# Patient Record
Sex: FEMALE | Race: ASIAN | NOT HISPANIC OR LATINO
[De-identification: names, ages, dates, MRNs, and addresses within clinical notes are randomized per-mention and may not be internally consistent; named-entity substitution may affect disease eponyms.]

---

## 2022-06-01 ENCOUNTER — NON-APPOINTMENT (OUTPATIENT)
Age: 42
End: 2022-06-01

## 2022-06-01 PROBLEM — Z00.00 ENCOUNTER FOR PREVENTIVE HEALTH EXAMINATION: Status: ACTIVE | Noted: 2022-06-01

## 2022-06-09 ENCOUNTER — APPOINTMENT (OUTPATIENT)
Dept: ORTHOPEDIC SURGERY | Facility: CLINIC | Age: 42
End: 2022-06-09
Payer: COMMERCIAL

## 2022-06-09 ENCOUNTER — NON-APPOINTMENT (OUTPATIENT)
Age: 42
End: 2022-06-09

## 2022-06-09 VITALS — HEIGHT: 69 IN | BODY MASS INDEX: 20.24 KG/M2 | WEIGHT: 136.69 LBS

## 2022-06-09 PROCEDURE — 99204 OFFICE O/P NEW MOD 45 MIN: CPT

## 2022-06-09 PROCEDURE — 73521 X-RAY EXAM HIPS BI 2 VIEWS: CPT

## 2022-06-09 RX ORDER — RISEDRONATE SODIUM 35 MG/1
35 TABLET, FILM COATED ORAL
Qty: 5 | Refills: 5 | Status: ACTIVE | COMMUNITY
Start: 2022-06-09 | End: 1900-01-01

## 2022-06-09 RX ORDER — DICLOFENAC SODIUM 50 MG/1
50 TABLET, DELAYED RELEASE ORAL
Qty: 20 | Refills: 0 | Status: ACTIVE | COMMUNITY
Start: 2022-06-09 | End: 1900-01-01

## 2022-06-09 NOTE — HISTORY OF PRESENT ILLNESS
[de-identified] : ALANA GA is a 41 year old female who presents for initial evaluation of bilateral hip pain R>L. Denies injury. Patient presents in a cane. She got COVID last Feburary and was placed on cortisone. 2 weeks and stopped it. She had sever leg pain. She had an MRI done 2 weeks ago. She states the pain has worsened over the past few weeks. She has difficulty with adls, getting up from seated position, getting out of a car, walking, prolonged standing.

## 2022-06-09 NOTE — DISCUSSION/SUMMARY
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 Discussed with patient non-operative and operative treatment. We had a lengthy discussion regarding core decompression versus total hip replacement, risk and benefits discussed. I discussed how core decompression with this level of AVN may fail which would warrant a total hip replacement in the future regardless.\par \par We discussed obtaining an MRI in 2 weeks. An MRI of the pelvis was ordered to evaluate for AVN. Follow up when results are available and discuss further surgical treatment plan. \par Rx Actonel and Meloxicam provided, r/b/a discussed.

## 2022-06-09 NOTE — PHYSICAL EXAM
[Normal RLE] : Right Lower Extremity: No scars, rashes, lesions, ulcers, skin intact [Normal LLE] : Left Lower Extremity: No scars, rashes, lesions, ulcers, skin intact [Normal Touch] : sensation intact for touch [Normal] : no peripheral adenopathy appreciated [de-identified] : Patient appears stated age in no acute respiratory distress. Patient is alert oriented x3. Patient has normal mood and affect. \par \par Bilateral knee exam\par Range of motion of the knee is 0-120°. \par Skin is normal. No rash.\par There is no effusion. No medial or lateral joint line tenderness. No swelling, no pitting edema.\par Overall alignment of the knee is then slight varus. Good anterior posterior stability. Firm endpoints on anterior and posterior drawer. \par Medial lateral stability is intact. Firm endpoint on medial and lateral stress testing .\par Kuldeep test is negative.\par Quadriceps strength 5/ 5. There is no loss of muscle volume in the thigh. \par Good anterior posterior and mediolateral stability.\par Sensation in the extremities intact. \par Discrimination is intact. Good DP and PT pulses.\par 		\par Bilateral hip exam\par The hip is mildly painful at the groin on log roll. \par At 70° flexion patient has minimal discomfort in the groin. At 90° flexion internal rotation is limited to less than 10°. External rotation is 25 no pain.\par Skin is normal. \par There is no loss of muscle wall remained the extremity. On lateral decubitus examination there is no tenderness in the greater trochanter. \par Resisted abduction is 4-5. There is no pain on abduction.\par Straight leg raise up to his 80° pain-free. No pain in the lower back.\par There is no adduction contracture. \par \par Lower Extremity Examination \par Bilateral lower extremity skin is normal. There is no rash. There is no edema and lymphadenopathy. DP and PT pulses intact. Sensation is intact.  [de-identified] : X-rays of the right/left hip 2 views obtained today 06/09/2022 shows evidence of AVN, femoral head collapse, R>L\par __________________________________________________________________\par 	\par EXAM:  MRI PELVIS WITHOUT CONTRAST\par HISTORY:  Right buttock pain.\par \par TECHNIQUE:  Multiplanar, multi-sequence MRI of the osseous pelvis was obtained on a 3T scanner according to standard protocol without intravenous or intra-articular contrast. \par \par COMPARISON:  None available.\par \par FINDINGS:\par \par Osseous structures: \par No fracture or\par \par Hip joint: \par Acute avascular necrosis of the right femoral head involving approximately 75% of the articular surface. There is no subchondral collapse. There is mild cartilage thinning at the hip joint. There is extensive bone marrow edema extending to the intercondylar region.\par Acute avascular necrosis of the left femoral head involving approximately 70% of the articular surface. There is extensive bone marrow edema extending to the intercondylar region. High-grade cartilage loss posteriorly with bone marrow edema in the posterior acetabulum. There is no articular surface collapse.\par Large bilateral hip joint effusions.\par \par Labrum: \par No visualized labral tear.\par \par Sacroiliac joints:\par Degenerative changes of the inferior sacroiliac joints.\par \par Tendons: \par Mild bilateral insertional gluteus minimus medius and minimus tendinosis with peritendinous edema.\par No trochanteric bursitis.\par Mild left insertional iliopsoas tendinosis with peritendinitis.\par No iliopsoas bursitis.\par No hamstring tendinosis, peritendinitis, or tear.\par Adductor origins and rectus abdominis insertions on pubic symphysis are grossly intact.\par \par Ischiofemoral fossae: \par No narrowing of the ischiofemoral spaces. No edema in the quadratus femoris muscles.\par \par Inguinal canals: \par No inguinal hernias.\par \par Included lumbar spine:\par Normal visualized lower lumbar spine.\par \par Muscles and nerves:\par No intramuscular edema or atrophy.\par Visualized sciatic and femoral nerves are unremarkable.\par \par Soft tissues:\par Mild amount of free fluid in the pelvis, likely physiologic. \par Physiologic fluid in the endometrial canal.\par \par IMPRESSION:  MRI of the pelvis demonstrates:\par \par Stage II avascular necrosis of both femoral heads with left hip cartilage loss posteriorly and subchondral stress response in the posterior acetabulum.\par \par Large bilateral hip joint effusions.\par \par Thank you for the opportunity to participate in the care of this patient.  \par  \par ELIZABETH HELTON MD  - Electronically Signed: 05- 9:24 AM \par Physician to Physician Direct Line is: [phone]

## 2022-06-09 NOTE — ADDENDUM
[FreeTextEntry1] : I, Edmundo Carmona, acted solely as a scribe for Dr. Hamida Quinones MD on this date 06/09/2022  .\par  \par All medical record entries made by the Scribe were at my, Dr. Hamida Quinones MD , direction and personally dictated by me on 06/09/2022 . I have reviewed the chart and agree that the record accurately reflects my personal performance of the history, physical exam, assessment and plan. I have also personally directed, reviewed, and agreed with the chart.

## 2022-06-22 ENCOUNTER — RESULT REVIEW (OUTPATIENT)
Age: 42
End: 2022-06-22

## 2022-06-22 RX ORDER — DICLOFENAC SODIUM 50 MG/1
50 TABLET, DELAYED RELEASE ORAL
Qty: 28 | Refills: 0 | Status: ACTIVE | COMMUNITY
Start: 2022-06-22 | End: 1900-01-01

## 2022-06-23 ENCOUNTER — APPOINTMENT (OUTPATIENT)
Dept: MRI IMAGING | Facility: CLINIC | Age: 42
End: 2022-06-23
Payer: COMMERCIAL

## 2022-06-23 ENCOUNTER — OUTPATIENT (OUTPATIENT)
Dept: OUTPATIENT SERVICES | Facility: HOSPITAL | Age: 42
LOS: 1 days | End: 2022-06-23
Payer: COMMERCIAL

## 2022-06-23 ENCOUNTER — RESULT REVIEW (OUTPATIENT)
Age: 42
End: 2022-06-23

## 2022-06-23 ENCOUNTER — TRANSCRIPTION ENCOUNTER (OUTPATIENT)
Age: 42
End: 2022-06-23

## 2022-06-23 DIAGNOSIS — M87.051 IDIOPATHIC ASEPTIC NECROSIS OF RIGHT FEMUR: ICD-10-CM

## 2022-06-23 DIAGNOSIS — M87.052 IDIOPATHIC ASEPTIC NECROSIS OF LEFT FEMUR: ICD-10-CM

## 2022-06-23 PROCEDURE — 73721 MRI JNT OF LWR EXTRE W/O DYE: CPT

## 2022-06-23 PROCEDURE — 73721 MRI JNT OF LWR EXTRE W/O DYE: CPT | Mod: 26,RT

## 2022-06-30 ENCOUNTER — APPOINTMENT (OUTPATIENT)
Dept: ORTHOPEDIC SURGERY | Facility: CLINIC | Age: 42
End: 2022-06-30

## 2022-06-30 DIAGNOSIS — M87.051 IDIOPATHIC ASEPTIC NECROSIS OF RIGHT FEMUR: ICD-10-CM

## 2022-06-30 PROCEDURE — 99215 OFFICE O/P EST HI 40 MIN: CPT

## 2022-07-01 PROBLEM — M87.051 AVASCULAR NECROSIS OF BONE OF RIGHT HIP: Status: ACTIVE | Noted: 2022-06-09

## 2022-07-06 ENCOUNTER — TRANSCRIPTION ENCOUNTER (OUTPATIENT)
Age: 42
End: 2022-07-06

## 2022-07-06 ENCOUNTER — OUTPATIENT (OUTPATIENT)
Dept: OUTPATIENT SERVICES | Facility: HOSPITAL | Age: 42
LOS: 1 days | End: 2022-07-06

## 2022-07-06 VITALS
HEIGHT: 69 IN | DIASTOLIC BLOOD PRESSURE: 73 MMHG | WEIGHT: 141.1 LBS | TEMPERATURE: 98 F | OXYGEN SATURATION: 100 % | SYSTOLIC BLOOD PRESSURE: 113 MMHG | HEART RATE: 75 BPM | RESPIRATION RATE: 17 BRPM

## 2022-07-06 DIAGNOSIS — M16.11 UNILATERAL PRIMARY OSTEOARTHRITIS, RIGHT HIP: ICD-10-CM

## 2022-07-06 LAB
A1C WITH ESTIMATED AVERAGE GLUCOSE RESULT: 5 % — SIGNIFICANT CHANGE UP (ref 4–5.6)
ANION GAP SERPL CALC-SCNC: 12 MMOL/L — SIGNIFICANT CHANGE UP (ref 7–14)
APPEARANCE UR: CLEAR — SIGNIFICANT CHANGE UP
BILIRUB UR-MCNC: NEGATIVE — SIGNIFICANT CHANGE UP
BLD GP AB SCN SERPL QL: NEGATIVE — SIGNIFICANT CHANGE UP
BUN SERPL-MCNC: 9 MG/DL — SIGNIFICANT CHANGE UP (ref 7–23)
CALCIUM SERPL-MCNC: 9.3 MG/DL — SIGNIFICANT CHANGE UP (ref 8.4–10.5)
CHLORIDE SERPL-SCNC: 103 MMOL/L — SIGNIFICANT CHANGE UP (ref 98–107)
CO2 SERPL-SCNC: 23 MMOL/L — SIGNIFICANT CHANGE UP (ref 22–31)
COLOR SPEC: YELLOW — SIGNIFICANT CHANGE UP
CREAT SERPL-MCNC: 0.62 MG/DL — SIGNIFICANT CHANGE UP (ref 0.5–1.3)
DIFF PNL FLD: NEGATIVE — SIGNIFICANT CHANGE UP
EGFR: 113 ML/MIN/1.73M2 — SIGNIFICANT CHANGE UP
ESTIMATED AVERAGE GLUCOSE: 97 — SIGNIFICANT CHANGE UP
GLUCOSE SERPL-MCNC: 86 MG/DL — SIGNIFICANT CHANGE UP (ref 70–99)
GLUCOSE UR QL: NEGATIVE — SIGNIFICANT CHANGE UP
HCG UR QL: NEGATIVE — SIGNIFICANT CHANGE UP
HCT VFR BLD CALC: 34.4 % — LOW (ref 34.5–45)
HGB BLD-MCNC: 11 G/DL — LOW (ref 11.5–15.5)
KETONES UR-MCNC: NEGATIVE — SIGNIFICANT CHANGE UP
LEUKOCYTE ESTERASE UR-ACNC: NEGATIVE — SIGNIFICANT CHANGE UP
MCHC RBC-ENTMCNC: 26.1 PG — LOW (ref 27–34)
MCHC RBC-ENTMCNC: 32 GM/DL — SIGNIFICANT CHANGE UP (ref 32–36)
MCV RBC AUTO: 81.7 FL — SIGNIFICANT CHANGE UP (ref 80–100)
NITRITE UR-MCNC: NEGATIVE — SIGNIFICANT CHANGE UP
NRBC # BLD: 0 /100 WBCS — SIGNIFICANT CHANGE UP
NRBC # FLD: 0 K/UL — SIGNIFICANT CHANGE UP
PH UR: 6 — SIGNIFICANT CHANGE UP (ref 5–8)
PLATELET # BLD AUTO: 225 K/UL — SIGNIFICANT CHANGE UP (ref 150–400)
POTASSIUM SERPL-MCNC: 4 MMOL/L — SIGNIFICANT CHANGE UP (ref 3.5–5.3)
POTASSIUM SERPL-SCNC: 4 MMOL/L — SIGNIFICANT CHANGE UP (ref 3.5–5.3)
PROT UR-MCNC: NEGATIVE — SIGNIFICANT CHANGE UP
RBC # BLD: 4.21 M/UL — SIGNIFICANT CHANGE UP (ref 3.8–5.2)
RBC # FLD: 14.2 % — SIGNIFICANT CHANGE UP (ref 10.3–14.5)
RH IG SCN BLD-IMP: NEGATIVE — SIGNIFICANT CHANGE UP
SODIUM SERPL-SCNC: 138 MMOL/L — SIGNIFICANT CHANGE UP (ref 135–145)
SP GR SPEC: 1.02 — SIGNIFICANT CHANGE UP (ref 1–1.05)
UROBILINOGEN FLD QL: SIGNIFICANT CHANGE UP
WBC # BLD: 6.31 K/UL — SIGNIFICANT CHANGE UP (ref 3.8–10.5)
WBC # FLD AUTO: 6.31 K/UL — SIGNIFICANT CHANGE UP (ref 3.8–10.5)

## 2022-07-06 RX ORDER — SODIUM CHLORIDE 9 MG/ML
1000 INJECTION, SOLUTION INTRAVENOUS
Refills: 0 | Status: DISCONTINUED | OUTPATIENT
Start: 2022-07-18 | End: 2022-07-19

## 2022-07-06 RX ORDER — SODIUM CHLORIDE 9 MG/ML
3 INJECTION INTRAMUSCULAR; INTRAVENOUS; SUBCUTANEOUS EVERY 8 HOURS
Refills: 0 | Status: DISCONTINUED | OUTPATIENT
Start: 2022-07-18 | End: 2022-07-19

## 2022-07-06 NOTE — H&P PST ADULT - MUSCULOSKELETAL
details… no joint swelling/no joint erythema/no calf tenderness/no chest wall tenderness/abnormal gait

## 2022-07-06 NOTE — H&P PST ADULT - PROBLEM SELECTOR PLAN 1
Schedule for right total hip replacement direct anterior approach tentatively on 07/18/2022. Pre op instructions, famotidine, chlorhexidine gluconate soap given and explained. Pt verbalized understanding.  Pt instructed to bring urine specimen on day of surgery urine cup given to pt who verbalized understanding.    Covid test ordered, list of [facility] Covid testing sites given to pt who verbalized understanding.

## 2022-07-06 NOTE — H&P PST ADULT - HISTORY OF PRESENT ILLNESS
43 y/o female h/o: bilateral hip pain mostly at night   Avascular necrosis on both hips, last MRI showed 75% damage and 70% on the left hip .  43 y/o female presents for pre op evaluation with C/O bilateral hip pain mostly at night x6 months. Pt stated that "last MRI showed 75% damage in the right hip and 70% in the left hip". Pt also stated that she has "avascular necrosis on both hips". Now schedule for right total hip replacement direct anterior approach

## 2022-07-06 NOTE — H&P PST ADULT - NSANTHOSAYNRD_GEN_A_CORE
No. [place] screening performed.  STOP BANG Legend: 0-2 = LOW Risk; 3-4 = INTERMEDIATE Risk; 5-8 = HIGH Risk

## 2022-07-07 LAB
CULTURE RESULTS: SIGNIFICANT CHANGE UP
MRSA PCR RESULT.: SIGNIFICANT CHANGE UP
S AUREUS DNA NOSE QL NAA+PROBE: SIGNIFICANT CHANGE UP
SPECIMEN SOURCE: SIGNIFICANT CHANGE UP

## 2022-07-07 RX ORDER — RISEDRONATE SODIUM 35 MG/1
35 TABLET, FILM COATED ORAL
Qty: 1 | Refills: 3 | Status: ACTIVE | COMMUNITY
Start: 2022-07-07 | End: 1900-01-01

## 2022-07-07 RX ORDER — DICLOFENAC SODIUM 50 MG/1
50 TABLET, DELAYED RELEASE ORAL
Qty: 20 | Refills: 0 | Status: ACTIVE | COMMUNITY
Start: 2022-07-07 | End: 1900-01-01

## 2022-07-13 PROBLEM — M87.059 IDIOPATHIC ASEPTIC NECROSIS OF UNSPECIFIED FEMUR: Chronic | Status: ACTIVE | Noted: 2022-07-06

## 2022-07-15 ENCOUNTER — NON-APPOINTMENT (OUTPATIENT)
Age: 42
End: 2022-07-15

## 2022-07-15 NOTE — ASU PATIENT PROFILE, ADULT - FALL HARM RISK - HARM RISK INTERVENTIONS

## 2022-07-16 LAB — SARS-COV-2 RNA SPEC QL NAA+PROBE: SIGNIFICANT CHANGE UP

## 2022-07-17 ENCOUNTER — TRANSCRIPTION ENCOUNTER (OUTPATIENT)
Age: 42
End: 2022-07-17

## 2022-07-18 ENCOUNTER — APPOINTMENT (OUTPATIENT)
Dept: ORTHOPEDIC SURGERY | Facility: HOSPITAL | Age: 42
End: 2022-07-18

## 2022-07-18 ENCOUNTER — TRANSCRIPTION ENCOUNTER (OUTPATIENT)
Age: 42
End: 2022-07-18

## 2022-07-18 ENCOUNTER — INPATIENT (INPATIENT)
Facility: HOSPITAL | Age: 42
LOS: 0 days | Discharge: HOME HEALTH | End: 2022-07-19
Attending: ORTHOPAEDIC SURGERY | Admitting: ORTHOPAEDIC SURGERY

## 2022-07-18 VITALS
TEMPERATURE: 98 F | HEIGHT: 69 IN | WEIGHT: 136.69 LBS | DIASTOLIC BLOOD PRESSURE: 75 MMHG | HEART RATE: 75 BPM | RESPIRATION RATE: 16 BRPM | SYSTOLIC BLOOD PRESSURE: 104 MMHG | OXYGEN SATURATION: 100 %

## 2022-07-18 DIAGNOSIS — M16.11 UNILATERAL PRIMARY OSTEOARTHRITIS, RIGHT HIP: ICD-10-CM

## 2022-07-18 LAB
GLUCOSE BLDC GLUCOMTR-MCNC: 72 MG/DL — SIGNIFICANT CHANGE UP (ref 70–99)
HCG UR QL: NEGATIVE — SIGNIFICANT CHANGE UP

## 2022-07-18 PROCEDURE — 72170 X-RAY EXAM OF PELVIS: CPT | Mod: 26

## 2022-07-18 PROCEDURE — 27130 TOTAL HIP ARTHROPLASTY: CPT | Mod: RT

## 2022-07-18 DEVICE — STEM NECK ANG HIP V40 127D SZ 6 35X111MM: Type: IMPLANTABLE DEVICE | Status: FUNCTIONAL

## 2022-07-18 DEVICE — HEAD BIOLOX DELTA CERAMIC V40 32MM PLUS0: Type: IMPLANTABLE DEVICE | Status: FUNCTIONAL

## 2022-07-18 DEVICE — IMPLANTABLE DEVICE: Type: IMPLANTABLE DEVICE | Status: FUNCTIONAL

## 2022-07-18 DEVICE — SHELL ACET TRIDENT II D 48MM: Type: IMPLANTABLE DEVICE | Status: FUNCTIONAL

## 2022-07-18 RX ORDER — PANTOPRAZOLE SODIUM 20 MG/1
40 TABLET, DELAYED RELEASE ORAL ONCE
Refills: 0 | Status: COMPLETED | OUTPATIENT
Start: 2022-07-18 | End: 2022-07-18

## 2022-07-18 RX ORDER — PANTOPRAZOLE SODIUM 20 MG/1
40 TABLET, DELAYED RELEASE ORAL
Refills: 0 | Status: DISCONTINUED | OUTPATIENT
Start: 2022-07-18 | End: 2022-07-19

## 2022-07-18 RX ORDER — CEFAZOLIN SODIUM 1 G
2000 VIAL (EA) INJECTION EVERY 8 HOURS
Refills: 0 | Status: COMPLETED | OUTPATIENT
Start: 2022-07-18 | End: 2022-07-19

## 2022-07-18 RX ORDER — SODIUM CHLORIDE 9 MG/ML
500 INJECTION, SOLUTION INTRAVENOUS ONCE
Refills: 0 | Status: COMPLETED | OUTPATIENT
Start: 2022-07-18 | End: 2022-07-18

## 2022-07-18 RX ORDER — ONDANSETRON 8 MG/1
4 TABLET, FILM COATED ORAL EVERY 6 HOURS
Refills: 0 | Status: DISCONTINUED | OUTPATIENT
Start: 2022-07-18 | End: 2022-07-19

## 2022-07-18 RX ORDER — KETOROLAC TROMETHAMINE 30 MG/ML
15 SYRINGE (ML) INJECTION EVERY 6 HOURS
Refills: 0 | Status: DISCONTINUED | OUTPATIENT
Start: 2022-07-18 | End: 2022-07-19

## 2022-07-18 RX ORDER — ACETAMINOPHEN 500 MG
1000 TABLET ORAL ONCE
Refills: 0 | Status: DISCONTINUED | OUTPATIENT
Start: 2022-07-18 | End: 2022-07-19

## 2022-07-18 RX ORDER — OXYCODONE HYDROCHLORIDE 5 MG/1
10 TABLET ORAL
Refills: 0 | Status: DISCONTINUED | OUTPATIENT
Start: 2022-07-18 | End: 2022-07-19

## 2022-07-18 RX ORDER — ACETAMINOPHEN 500 MG
975 TABLET ORAL ONCE
Refills: 0 | Status: COMPLETED | OUTPATIENT
Start: 2022-07-18 | End: 2022-07-18

## 2022-07-18 RX ORDER — SODIUM CHLORIDE 9 MG/ML
1000 INJECTION, SOLUTION INTRAVENOUS
Refills: 0 | Status: DISCONTINUED | OUTPATIENT
Start: 2022-07-18 | End: 2022-07-19

## 2022-07-18 RX ORDER — OXYCODONE HYDROCHLORIDE 5 MG/1
5 TABLET ORAL
Refills: 0 | Status: DISCONTINUED | OUTPATIENT
Start: 2022-07-18 | End: 2022-07-19

## 2022-07-18 RX ORDER — SENNA PLUS 8.6 MG/1
2 TABLET ORAL AT BEDTIME
Refills: 0 | Status: DISCONTINUED | OUTPATIENT
Start: 2022-07-18 | End: 2022-07-19

## 2022-07-18 RX ORDER — SODIUM CHLORIDE 9 MG/ML
500 INJECTION, SOLUTION INTRAVENOUS ONCE
Refills: 0 | Status: COMPLETED | OUTPATIENT
Start: 2022-07-18 | End: 2022-07-19

## 2022-07-18 RX ORDER — POLYETHYLENE GLYCOL 3350 17 G/17G
17 POWDER, FOR SOLUTION ORAL AT BEDTIME
Refills: 0 | Status: DISCONTINUED | OUTPATIENT
Start: 2022-07-18 | End: 2022-07-19

## 2022-07-18 RX ORDER — ACETAMINOPHEN 500 MG
975 TABLET ORAL EVERY 8 HOURS
Refills: 0 | Status: DISCONTINUED | OUTPATIENT
Start: 2022-07-18 | End: 2022-07-19

## 2022-07-18 RX ORDER — MAGNESIUM HYDROXIDE 400 MG/1
30 TABLET, CHEWABLE ORAL DAILY
Refills: 0 | Status: DISCONTINUED | OUTPATIENT
Start: 2022-07-18 | End: 2022-07-19

## 2022-07-18 RX ORDER — CHOLECALCIFEROL (VITAMIN D3) 125 MCG
1000 CAPSULE ORAL DAILY
Refills: 0 | Status: DISCONTINUED | OUTPATIENT
Start: 2022-07-18 | End: 2022-07-19

## 2022-07-18 RX ORDER — TRAMADOL HYDROCHLORIDE 50 MG/1
50 TABLET ORAL ONCE
Refills: 0 | Status: DISCONTINUED | OUTPATIENT
Start: 2022-07-18 | End: 2022-07-18

## 2022-07-18 RX ORDER — TRAMADOL HYDROCHLORIDE 50 MG/1
50 TABLET ORAL EVERY 6 HOURS
Refills: 0 | Status: DISCONTINUED | OUTPATIENT
Start: 2022-07-18 | End: 2022-07-19

## 2022-07-18 RX ORDER — ASPIRIN/CALCIUM CARB/MAGNESIUM 324 MG
325 TABLET ORAL
Refills: 0 | Status: DISCONTINUED | OUTPATIENT
Start: 2022-07-18 | End: 2022-07-19

## 2022-07-18 RX ORDER — CELECOXIB 200 MG/1
200 CAPSULE ORAL EVERY 12 HOURS
Refills: 0 | Status: DISCONTINUED | OUTPATIENT
Start: 2022-07-19 | End: 2022-07-19

## 2022-07-18 RX ADMIN — SODIUM CHLORIDE 3 MILLILITER(S): 9 INJECTION INTRAMUSCULAR; INTRAVENOUS; SUBCUTANEOUS at 13:44

## 2022-07-18 RX ADMIN — Medication 15 MILLIGRAM(S): at 23:14

## 2022-07-18 RX ADMIN — Medication 975 MILLIGRAM(S): at 10:50

## 2022-07-18 RX ADMIN — Medication 15 MILLIGRAM(S): at 18:10

## 2022-07-18 RX ADMIN — SODIUM CHLORIDE 500 MILLILITER(S): 9 INJECTION, SOLUTION INTRAVENOUS at 13:43

## 2022-07-18 RX ADMIN — OXYCODONE HYDROCHLORIDE 5 MILLIGRAM(S): 5 TABLET ORAL at 17:12

## 2022-07-18 RX ADMIN — Medication 15 MILLIGRAM(S): at 18:53

## 2022-07-18 RX ADMIN — OXYCODONE HYDROCHLORIDE 10 MILLIGRAM(S): 5 TABLET ORAL at 22:23

## 2022-07-18 RX ADMIN — SODIUM CHLORIDE 500 MILLILITER(S): 9 INJECTION, SOLUTION INTRAVENOUS at 19:56

## 2022-07-18 RX ADMIN — PANTOPRAZOLE SODIUM 40 MILLIGRAM(S): 20 TABLET, DELAYED RELEASE ORAL at 10:49

## 2022-07-18 RX ADMIN — SENNA PLUS 2 TABLET(S): 8.6 TABLET ORAL at 21:23

## 2022-07-18 RX ADMIN — Medication 325 MILLIGRAM(S): at 18:10

## 2022-07-18 RX ADMIN — Medication 100 MILLIGRAM(S): at 19:58

## 2022-07-18 RX ADMIN — SODIUM CHLORIDE 30 MILLILITER(S): 9 INJECTION, SOLUTION INTRAVENOUS at 10:52

## 2022-07-18 RX ADMIN — OXYCODONE HYDROCHLORIDE 5 MILLIGRAM(S): 5 TABLET ORAL at 16:42

## 2022-07-18 RX ADMIN — SODIUM CHLORIDE 3 MILLILITER(S): 9 INJECTION INTRAMUSCULAR; INTRAVENOUS; SUBCUTANEOUS at 21:00

## 2022-07-18 RX ADMIN — OXYCODONE HYDROCHLORIDE 10 MILLIGRAM(S): 5 TABLET ORAL at 21:23

## 2022-07-18 RX ADMIN — TRAMADOL HYDROCHLORIDE 50 MILLIGRAM(S): 50 TABLET ORAL at 10:50

## 2022-07-18 RX ADMIN — SODIUM CHLORIDE 100 MILLILITER(S): 9 INJECTION, SOLUTION INTRAVENOUS at 15:19

## 2022-07-18 RX ADMIN — Medication 975 MILLIGRAM(S): at 20:00

## 2022-07-18 NOTE — DISCHARGE NOTE PROVIDER - CARE PROVIDER_API CALL
Hamida Quinones)  Orthopaedic Surgery  [address], [address]  [place] [address]  Phone: [phone]  Fax: [phone]  Follow Up Time: 2 weeks

## 2022-07-18 NOTE — DISCHARGE NOTE PROVIDER - NSDCFUSCHEDAPPT_GEN_ALL_CORE_FT
Hamida Quinones  [facility]  ORTHOSURG [address] [facility]  Scheduled Appointment: 08/04/2022

## 2022-07-18 NOTE — DISCHARGE NOTE PROVIDER - HOSPITAL COURSE
43 y/o Female presents to [facility] for orthopedic surgery. Patient s/p right total hip arthroplasty with Dr. Quinones on 7/18/22. Patient tolerated the procedure well without any intraoperative complications. Patient tolerated physical therapy well, and the pain was controlled. Pt is weight bearing as tolerated with walker only, ANTERIOR HIP PRECAUTIONS. Seen by medical attending for continuity of care and management and cleared for safe discharge. Keep dressing/incision clean, dry and intact. Any suture/staples to be removed on post-op day #14 your office visit. Pt is on Aspirin 325mg BID for DVT prophylaxis, please take for 6 weeks unless otherwise instructed by your surgeon. Please follow up with Dr. Quinones in 2 weeks, call the office to make an appointment, [phone]. Please follow up with your PMD for continuity of care and management as medications may have changed.

## 2022-07-18 NOTE — PHYSICAL THERAPY INITIAL EVALUATION ADULT - GAIT DEVIATIONS NOTED, PT EVAL
decreased kamryn/increased time in double stance/decreased velocity of limb motion/decreased weight-shifting ability

## 2022-07-18 NOTE — DISCHARGE NOTE PROVIDER - NSDCCPTREATMENT_GEN_ALL_CORE_FT
PRINCIPAL PROCEDURE  Procedure: Right total hip replacement  Findings and Treatment: 41 y/o Female presents to [facility] for orthopedic surgery. Patient s/p right total hip arthroplasty with Dr. Quinones on 7/18/22. Patient tolerated the procedure well without any intraoperative complications. Patient tolerated physical therapy well, and the pain was controlled. Pt is weight bearing as tolerated with walker only, ANTERIOR HIP PRECAUTIONS. Seen by medical attending for continuity of care and management and cleared for safe discharge. Keep dressing/incision clean, dry and intact. Any suture/staples to be removed on post-op day #14 your office visit. Pt is on Aspirin 325mg BID for DVT prophylaxis, please take for 6 weeks unless otherwise instructed by your surgeon. Please follow up with Dr. Quinones in 2 weeks, call the office to make an appointment, [phone]. Please follow up with your PMD for continuity of care and management as medications may have changed.

## 2022-07-18 NOTE — DISCHARGE NOTE PROVIDER - NSDCMRMEDTOKEN_GEN_ALL_CORE_FT
calcium: 1 tab(s) orally once a day  diclofenac sodium 50 mg oral delayed release tablet: 1 tab(s) orally every 12 hours-last dose 07/06/22  magnesium: 1 tab(s) orally once a day  Motrin 400 mg oral tablet: 1 tab(s) orally every 6 hours, As Needed-last dose 07/06/22  risedronate 35 mg oral tablet: 1 tab(s) orally once a week  Tylenol 500 mg oral tablet: 2 tab(s) orally every 6 hours, As Needed  Vitamin B-100 oral tablet: 1 tab(s) orally once a day  Vitamin D3: 1 tab(s) orally once a day   aspirin 325 mg oral tablet: 1 tab(s) orally 2 times a day MDD:2 tabs  calcium: 1 tab(s) orally once a day  celecoxib 200 mg oral capsule: 1 cap(s) orally every 12 hours MDD:2 tabs  magnesium: 1 tab(s) orally once a day  oxyCODONE 5 mg oral tablet: 1 tab(s) orally every 4 hours, As Needed -Moderate-Severe pain pain MDD:6 tabs  pantoprazole 40 mg oral delayed release tablet: 1 tab(s) orally once a day (before a meal) MDD:1 tab  polyethylene glycol 3350 oral powder for reconstitution: 17 gram(s) orally once a day (at bedtime) MDD:17 grams  risedronate 35 mg oral tablet: 1 tab(s) orally once a week  senna leaf extract oral tablet: 2 tab(s) orally once a day (at bedtime) MDD:2 tabs  traMADol 50 mg oral tablet: 1 tab(s) orally every 8 hours, As Needed -Mild Pain (1 - 3) MDD:3 tabs  Tylenol 500 mg oral tablet: 2 tab(s) orally every 6 hours, As Needed  Vitamin B-100 oral tablet: 1 tab(s) orally once a day  Vitamin D3: 1 tab(s) orally once a day

## 2022-07-19 ENCOUNTER — TRANSCRIPTION ENCOUNTER (OUTPATIENT)
Age: 42
End: 2022-07-19

## 2022-07-19 VITALS
OXYGEN SATURATION: 100 % | TEMPERATURE: 98 F | HEART RATE: 76 BPM | RESPIRATION RATE: 17 BRPM | SYSTOLIC BLOOD PRESSURE: 108 MMHG | DIASTOLIC BLOOD PRESSURE: 60 MMHG

## 2022-07-19 DIAGNOSIS — D72.829 ELEVATED WHITE BLOOD CELL COUNT, UNSPECIFIED: ICD-10-CM

## 2022-07-19 DIAGNOSIS — D50.0 IRON DEFICIENCY ANEMIA SECONDARY TO BLOOD LOSS (CHRONIC): ICD-10-CM

## 2022-07-19 LAB
ANION GAP SERPL CALC-SCNC: 12 MMOL/L — SIGNIFICANT CHANGE UP (ref 7–14)
BUN SERPL-MCNC: 7 MG/DL — SIGNIFICANT CHANGE UP (ref 7–23)
CALCIUM SERPL-MCNC: 9.1 MG/DL — SIGNIFICANT CHANGE UP (ref 8.4–10.5)
CHLORIDE SERPL-SCNC: 106 MMOL/L — SIGNIFICANT CHANGE UP (ref 98–107)
CO2 SERPL-SCNC: 21 MMOL/L — LOW (ref 22–31)
CREAT SERPL-MCNC: 0.58 MG/DL — SIGNIFICANT CHANGE UP (ref 0.5–1.3)
EGFR: 114 ML/MIN/1.73M2 — SIGNIFICANT CHANGE UP
GLUCOSE SERPL-MCNC: 105 MG/DL — HIGH (ref 70–99)
HCT VFR BLD CALC: 32.2 % — LOW (ref 34.5–45)
HGB BLD-MCNC: 10.5 G/DL — LOW (ref 11.5–15.5)
MCHC RBC-ENTMCNC: 26 PG — LOW (ref 27–34)
MCHC RBC-ENTMCNC: 32.6 GM/DL — SIGNIFICANT CHANGE UP (ref 32–36)
MCV RBC AUTO: 79.7 FL — LOW (ref 80–100)
NRBC # BLD: 0 /100 WBCS — SIGNIFICANT CHANGE UP
NRBC # FLD: 0 K/UL — SIGNIFICANT CHANGE UP
PLATELET # BLD AUTO: 203 K/UL — SIGNIFICANT CHANGE UP (ref 150–400)
POTASSIUM SERPL-MCNC: 4.4 MMOL/L — SIGNIFICANT CHANGE UP (ref 3.5–5.3)
POTASSIUM SERPL-SCNC: 4.4 MMOL/L — SIGNIFICANT CHANGE UP (ref 3.5–5.3)
RBC # BLD: 4.04 M/UL — SIGNIFICANT CHANGE UP (ref 3.8–5.2)
RBC # FLD: 14.6 % — HIGH (ref 10.3–14.5)
SODIUM SERPL-SCNC: 139 MMOL/L — SIGNIFICANT CHANGE UP (ref 135–145)
WBC # BLD: 11.89 K/UL — HIGH (ref 3.8–10.5)
WBC # FLD AUTO: 11.89 K/UL — HIGH (ref 3.8–10.5)

## 2022-07-19 PROCEDURE — 99223 1ST HOSP IP/OBS HIGH 75: CPT

## 2022-07-19 RX ORDER — CELECOXIB 200 MG/1
1 CAPSULE ORAL
Qty: 14 | Refills: 0
Start: 2022-07-19 | End: 2022-07-25

## 2022-07-19 RX ORDER — SENNA PLUS 8.6 MG/1
2 TABLET ORAL
Qty: 14 | Refills: 0
Start: 2022-07-19 | End: 2022-07-25

## 2022-07-19 RX ORDER — OXYCODONE HYDROCHLORIDE 5 MG/1
1 TABLET ORAL
Qty: 42 | Refills: 0
Start: 2022-07-19 | End: 2022-07-25

## 2022-07-19 RX ORDER — PANTOPRAZOLE SODIUM 20 MG/1
1 TABLET, DELAYED RELEASE ORAL
Qty: 30 | Refills: 0
Start: 2022-07-19 | End: 2022-08-17

## 2022-07-19 RX ORDER — DICLOFENAC SODIUM 75 MG/1
1 TABLET, DELAYED RELEASE ORAL
Qty: 0 | Refills: 0 | DISCHARGE

## 2022-07-19 RX ORDER — POLYETHYLENE GLYCOL 3350 17 G/17G
17 POWDER, FOR SOLUTION ORAL
Qty: 119 | Refills: 0
Start: 2022-07-19 | End: 2022-07-25

## 2022-07-19 RX ORDER — IBUPROFEN 200 MG
1 TABLET ORAL
Qty: 0 | Refills: 0 | DISCHARGE

## 2022-07-19 RX ORDER — TRAMADOL HYDROCHLORIDE 50 MG/1
1 TABLET ORAL
Qty: 21 | Refills: 0
Start: 2022-07-19 | End: 2022-07-25

## 2022-07-19 RX ORDER — LANOLIN ALCOHOL/MO/W.PET/CERES
3 CREAM (GRAM) TOPICAL AT BEDTIME
Refills: 0 | Status: DISCONTINUED | OUTPATIENT
Start: 2022-07-19 | End: 2022-07-19

## 2022-07-19 RX ORDER — ASPIRIN/CALCIUM CARB/MAGNESIUM 324 MG
1 TABLET ORAL
Qty: 84 | Refills: 0
Start: 2022-07-19 | End: 2022-08-29

## 2022-07-19 RX ADMIN — Medication 15 MILLIGRAM(S): at 05:53

## 2022-07-19 RX ADMIN — Medication 1000 UNIT(S): at 12:30

## 2022-07-19 RX ADMIN — OXYCODONE HYDROCHLORIDE 10 MILLIGRAM(S): 5 TABLET ORAL at 15:35

## 2022-07-19 RX ADMIN — Medication 975 MILLIGRAM(S): at 02:56

## 2022-07-19 RX ADMIN — Medication 1 TABLET(S): at 12:30

## 2022-07-19 RX ADMIN — SODIUM CHLORIDE 500 MILLILITER(S): 9 INJECTION, SOLUTION INTRAVENOUS at 05:53

## 2022-07-19 RX ADMIN — Medication 3 MILLIGRAM(S): at 02:56

## 2022-07-19 RX ADMIN — PANTOPRAZOLE SODIUM 40 MILLIGRAM(S): 20 TABLET, DELAYED RELEASE ORAL at 05:53

## 2022-07-19 RX ADMIN — Medication 325 MILLIGRAM(S): at 05:53

## 2022-07-19 RX ADMIN — POLYETHYLENE GLYCOL 3350 17 GRAM(S): 17 POWDER, FOR SOLUTION ORAL at 12:31

## 2022-07-19 RX ADMIN — Medication 975 MILLIGRAM(S): at 11:16

## 2022-07-19 RX ADMIN — SODIUM CHLORIDE 3 MILLILITER(S): 9 INJECTION INTRAMUSCULAR; INTRAVENOUS; SUBCUTANEOUS at 14:12

## 2022-07-19 RX ADMIN — Medication 100 MILLIGRAM(S): at 03:37

## 2022-07-19 NOTE — CONSULT NOTE ADULT - SUBJECTIVE AND OBJECTIVE BOX
[facility] Division of Hospital Medicine  Gilson Salinas MD  Pager (ELIZABETH-JASIEL, 6A-7Y): [phone]  Other Times:  [phone]    Patient is a 42y old  Female who presents with a chief complaint of right total hip arthroplasty  (18 Jul 2022 21:00)      HPI:  43 y/o female presents for pre op evaluation with C/O bilateral hip pain mostly at night x6 months. Pt stated that "last MRI showed 75% damage in the right hip and 70% in the left hip". Pt also stated that she has "avascular necrosis on both hips". Now schedule for right total hip replacement direct anterior approach   (06 Jul 2022 17:09). s/p Rt THR on 7/18. Pain is rated 6/10; localized at surgical site, achy in nature, non-radiating, worse with movement, but improved with pain meds.     No F/C, N/V, CP, SOB, Cough, lightheadedness, dizziness, abdominal pain, diarrhea, dysuria.    Pain Symptoms if applicable:             	                         none	   mild         moderate         severe  Pain:	           6                 0	    1-3	     4-6	         7-10  Location:	Surgical site  Modifying factors:	Worse with movement  Associated symptoms:	    Allergies    No Known Allergies    Intolerances        HOME MEDICATIONS: Reviewed    MEDICATIONS  (STANDING):  acetaminophen     Tablet .. 975 milliGRAM(s) Oral every 8 hours  acetaminophen   IVPB .. 1000 milliGRAM(s) IV Intermittent once  aspirin 325 milliGRAM(s) Oral two times a day  celecoxib 200 milliGRAM(s) Oral every 12 hours  cholecalciferol 1000 Unit(s) Oral daily  lactated ringers. 1000 milliLiter(s) (100 mL/Hr) IV Continuous <Continuous>  lactated ringers. 1000 milliLiter(s) (30 mL/Hr) IV Continuous <Continuous>  multivitamin 1 Tablet(s) Oral daily  pantoprazole    Tablet 40 milliGRAM(s) Oral before breakfast  polyethylene glycol 3350 17 Gram(s) Oral at bedtime  senna 2 Tablet(s) Oral at bedtime  sodium chloride 0.9% lock flush 3 milliLiter(s) IV Push every 8 hours    MEDICATIONS  (PRN):  magnesium hydroxide Suspension 30 milliLiter(s) Oral daily PRN Constipation  melatonin 3 milliGRAM(s) Oral at bedtime PRN Insomnia  ondansetron Injectable 4 milliGRAM(s) IV Push every 6 hours PRN Nausea and/or Vomiting  oxyCODONE    IR 5 milliGRAM(s) Oral every 3 hours PRN Moderate Pain (4 - 6)  oxyCODONE    IR 10 milliGRAM(s) Oral every 3 hours PRN Severe Pain (7 - 10)  traMADol 50 milliGRAM(s) Oral every 6 hours PRN Mild Pain (1 - 3)      PAST MEDICAL & SURGICAL HISTORY:  Avascular necrosis of bone of hip  bilateral      No significant past surgical history          SOCIAL HISTORY:  No tobacco/alcohol use/abuse.    FAMILY HISTORY:      REVIEW OF SYSTEMS:    CONSTITUTIONAL: No fever, weight loss, or fatigue  EYES: No eye pain, visual disturbances, or discharge  ENMT:  No difficulty hearing, tinnitus, vertigo; No sinus or throat pain  NECK: No pain or stiffness  RESPIRATORY: No cough, wheezing, chills or hemoptysis; No shortness of breath  CARDIOVASCULAR: No chest pain, palpitations, dizziness, or leg swelling  GASTROINTESTINAL: No abdominal or epigastric pain. No nausea, vomiting, or hematemesis; No diarrhea or constipation. No melena or hematochezia.  GENITOURINARY: No dysuria, frequency, hematuria, or incontinence  NEUROLOGICAL: No headaches, memory loss, loss of strength, numbness, or tremors  SKIN: No itching, burning, rashes, or lesions   LYMPH NODES: No enlarged glands  ENDOCRINE: No heat or cold intolerance; No hair loss  MUSCULOSKELETAL: Rt hip pain with movement.  PSYCHIATRIC: No depression, anxiety, mood swings, or difficulty sleeping  HEME/LYMPH: No easy bruising, or bleeding gums  ALLERGY AND IMMUNOLOGIC: No hives or eczema    Vital Signs Last 24 Hrs  T(C): 36.8 (19 Jul 2022 05:42), Max: 36.8 (18 Jul 2022 19:49)  T(F): 98.2 (19 Jul 2022 05:42), Max: 98.2 (18 Jul 2022 19:49)  HR: 70 (19 Jul 2022 05:42) (65 - 85)  BP: 100/62 (19 Jul 2022 05:42) (100/62 - 119/81)  BP(mean): 82 (18 Jul 2022 18:30) (76 - 90)  RR: 17 (19 Jul 2022 05:42) (12 - 18)  SpO2: 100% (19 Jul 2022 05:42) (95% - 100%)    Parameters below as of 19 Jul 2022 05:42  Patient On (Oxygen Delivery Method): room air      CAPILLARY BLOOD GLUCOSE          PHYSICAL EXAM:    CONSTITUTIONAL: NAD, well-developed, well-groomed  EYES: PERRLA; conjunctiva and sclera clear  ENMT: Moist oral mucosa, no pharyngeal injection or exudates; normal dentition  NECK: Supple, no palpable masses; no thyromegaly  RESPIRATORY: Normal respiratory effort; lungs are clear to auscultation bilaterally  CARDIOVASCULAR: Regular rate and rhythm, normal S1 and S2, no murmur/rub/gallop; No lower extremity edema; Peripheral pulses are 2+ bilaterally  ABDOMEN: Nontender to palpation, normoactive bowel sounds, no rebound/guarding; No hepatosplenomegaly  MUSCULOSKELETAL:  Normal gait; no clubbing or cyanosis of digits; Rt hip limited ROM due to pain.  PSYCH: A+O to person, place, and time; affect appropriate  NEUROLOGY: CN 2-12 are intact and symmetric; no gross sensory deficits   SKIN: No rashes; no palpable lesions, surgical dressing C/D/I    LABS:                        10.5   11.89 )-----------( 203      ( 19 Jul 2022 06:00 )             32.2     07-19    139  |  106  |  7   ----------------------------<  105<H>  4.4   |  21<L>  |  0.58    Ca    9.1      19 Jul 2022 06:00          CAPILLARY BLOOD GLUCOSE      POCT Blood Glucose.: 72 mg/dL (18 Jul 2022 11:22)      RADIOLOGY & ADDITIONAL STUDIES:    Imaging:   Personally Reviewed:  [ ] YES               EKG:   Personally Reviewed:  [ ] YES       Care Discussed with Consultant(s)/Other Providers:  Care Discussed with Primary Team.      [ ] Increased delirium risk  [ ] Delirium and other risks can be reduced by:          -early ambulation          -minimizing "tethers" - IV, oxygen, catheters, etc          -avoiding hypnotics and sedatives          -maintaining hydration/nutrition          -avoid anticholinergics - diphenhydramine, etc          -pain control          -supportive environment

## 2022-07-19 NOTE — DISCHARGE NOTE NURSING/CASE MANAGEMENT/SOCIAL WORK - PATIENT PORTAL LINK FT
You can access the FollowMyHealth Patient Portal offered by [facility] by registering at the following website: [email or web address]. By joining [facility]’s FollowMyHealth portal, you will also be able to view your health information using other applications (apps) compatible with our system.

## 2022-07-19 NOTE — DISCHARGE NOTE NURSING/CASE MANAGEMENT/SOCIAL WORK - NSDCPNINST_GEN_ALL_CORE
Please follow up with your surgeon two weeks after discharge. Please follow anterior hip precautions (i.e do no turn your foot outwards, do not cross your legs, do not the operated hip's leg backwards, etc.) Please see anterior hip precaution sheet in your discharge folder. Please ambulate with your walker, and do not remain sitting for long periods of time without moving/walking. Please take pain meds to alleviate pain. If pain is not alleviated with pain meds, please call 's office. Please maintain a healthy diet. Constipation is a side effect of narcotics. If you experience any shortness of breath, chest pain, or fevers greater than 100.4, please come to the emergency room.

## 2022-07-19 NOTE — CONSULT NOTE ADULT - PROBLEM SELECTOR RECOMMENDATION 9
- s/p Rt THR 7/18  - Pain control with Toradol PRN, oxyIR PRN, ultram PRN   - Surgical site management as per ortho  - PT/OT eval for safe dispo  - VTE ppx - ASA BID

## 2022-07-19 NOTE — PROGRESS NOTE ADULT - ASSESSMENT
A/P: 42F s/p R anterior MARTIN    Neuro: Pain control  Resp: IS  GI: Regular diet, bowel reg  MSK: WBAT, PT/OT  Heme: DVT PPX: A325 BID  FU AM labs  Anterior precautions   Dispo: Home

## 2022-07-19 NOTE — PATIENT PROFILE ADULT - SURGICAL SITE DESCRIPTION
ACUTE OT Evaluation     Therapy Evaluation: PT evaluation is not warranted at this time.  Mobility assessment, Safety, ADL independence, completed via approved triage process to assess safety, balance, mobility, memory, cognition and functional independence.  Results and recommendations discussed with Physical therapist,.      Pt seen on 4  nursing unit.                                                          Frequency Comments: MTWF      Admitting complaint:: Hyperglycemia [R73.9]  Hypotension, unspecified hypotension type [I95.9]                                                                                         Precautions  Weight Bearing Status: Partial weight bearing right upper extremity (11/20/18 1045)  Other Precautions: nondisplaced fracture to the right fourth and fifth metacarpal, splint  (11/20/18 1045)    Co-morbidities:   Patient Active Problem List   Diagnosis   • Prepatellar bursitis   • DM (diabetes mellitus) (CMS/[facility])   • HTN (hypertension)   • HLD (hyperlipidemia)   • Opioid abuse (CMS/[facility])   • Tobacco use disorder   • Seizure (CMS/[facility])   • Acute respiratory failure with hypoxia and hypercapnia (CMS/[facility])   • Closed trimalleolar fracture of right ankle   • Syndesmotic disruption of right ankle   • Skin necrosis right ankle   • Nonhealing surgical wound       ASSESSMENT: Patient, 56 year old, familiar to therapies from previous admits, admitted 11/19/18, with above signifiant past medical history, COPD, diabetes mellitus type 2, hypertension, last admit 11/9- 11/13 possible seizure, EEG was normal. Admitted with non displaced fracture the right 4th and 5th metacarpal, admitting complaint hyperglycemia, nasal congestion.     PT triaged OUT per triage process. Patient uses wheel chair at baseline due to peripheral neuropathy. Per patient, does not ambulate with 2 ww, demonstrated ability for modified pivot transfers during session, safety, transfers and ADLs near modified independent. No  Romberg for balance addressed, good functional reach using left UE, PT triaged out, patient familiar to both PT/OT previous hospitalizations, very particular, non compliant to teaching, OT will continue to work with patient for safe pivot transfers and ADLs.     Patient very particular, with skilled therapies, non compliant. Patient at baseline uses wheel chair, states during session she has not been walking for some time in her home with her 2 ww, uses her commode or bathroom, modified pivot transfers, demonstrates modified pivot transfers, bed mobility, boosting, completion of toileting clean-up/bathing of LB and threading, hiking, adjusting depends in place, slipping on slip on shoes, all at supervision. Limited only by splint to nondisplaced fracture to the right fourth and fifth metacarpal, using left hand for all ADLs this session. Needing only assist for managing depends over right hip and for combing right side of hair during session. Patient stating she is waiting for Dr. Cazares as she wants to go home by tomorrow and plans to do her cooking for [employer]. Patient splint prior to bed mobility was donned to right hand to support and non weight bearing was maintained all but 2 times, patient forgot but on simple R hand placement wearing splint was quickly reminded by pain, pain which lessens on repositioning into supine, propped particular two layer folded blankets and pillows. Patient was able to boost her own self into proper position, no assist from OT needed.       Occupational Therapy (OT) orders received due to impairments in ADL and instrumental-ADLS related to medical status and R nondisplaced fracture to the right fourth and fifth metacarpal, splint worn, addressing ADLs and related functional mobility techniques to progress patient return to modified independent.   The patient is currently functioning at minimal assist. The patient needs to be at a supervision/set up, no steadying needed, pivot  transfer between edge of bed and commode toileting, bathing, full UB/LB and dressing, grooming as well level for safe return to prior living situation.     Task Modification: clinical decision making of moderate complexity, minimal to moderate task modification         The patient will benefit from continued skilled OT to address these deficits. The prognosis for goal achievement is good.    See Flowsheet row data below for session detail and goals.     EDUCATION:  The patient was educated on the role of OT in the Acute care setting. The plan of care and goals were discussed and  Verbalizes understanding and Demonstrates understanding      RECOMMENDATIONS FOR DISCHARGE:  Recommendations for Discharge: OT: Home;Other (comment)(3 hour a day personal care giver, use w/c at [place],pivots) (11/20/18 1045)    OT Identified Barriers to Discharge: nondisplaced fracture to the right fourth and fifth metacarpal, splint, non weight bearing, however, patient shows near modified independent toileting/ADLs, only assist clothing management LB hiking right side, particular, non compliant, will do things her way herself      PT/OT Mobility Equipment for Discharge: uses wheel chair at baseline, pivot transfers between surfaces, owns commode, shower chair, PCW  (11/20/18 1045)  PT/OT ADL Equipment for Discharge: pivots to commode from wheel chair, completes own ADLs without assistive device, PCW assist as needed  (11/20/18 1045)    Assistance needed when returning home:   Discussed with patient/caregiver who acknowledged understanding of current recommendations for safe home discharge plan. Based on current level of assistance, recommendations are: home. Help to be provided by PCW 3 hours per day, demonstrating near modified independent ADLs, toileting and pivot transfers, wheel chair at baseline, commode transfers due to neuropathy .    ICU Mobility Assesment (PERME):         PLAN: Continue skilled OT, including the following  Treatment Interventions: ADL retraining;Functional transfer training;Endurance training;Patient/Family training;Cognitive reorientation;Equipment eval/education;Compensatory technique education;Fine motor coordination activities (11/20/18 1045)     Treatment Plan for Next Session: ADLs, compensatory techniques addressing LB hiking, threading, doffing/donning garments, pivot transfers, addressing nondisplaced fracture to the right fourth and fifth metacarpal, splint, non weight bearing restriction                                                           SUBJECTIVE: Patient's Personal Goal: return home, daily caregiver, states 3 hours per day, primarily uses w/c, pivot transfesr, commode, not ambulating  (11/20/18 1045)   Subjective: Patient (particular) verbalizing needs with ADL/toileting (needs) (11/20/18 1045)  Subjective/Objective Comments: RN aware of session. [place] ODOM, initially in patient room on OT arrival, OT took over  (11/20/18 1045)    OBJECTIVE:Basic Lines: IV;Telemetry (11/20/18 1045)  Safety Measures: Bed rails;Other (comment)(no alarms used, patient pivot transfers self to commode/bed ) (11/20/18 1045)    RN reported [place] Fall Scale Score: 95       Last 24 hours of Functional Data     ADLs  Self Cares/ADL's  Eating Assistance: Supine, bed;Other (comment);Set-up(long sitting, feeds with left hand, minor for lids ) (11/20/18 1045)  Grooming Assistance: Other (comment);Minimal Assist (Min);Set-up(uses left hand, seated on commode, set-up ) (11/20/18 1045)  Oral Hygiene Assistance: Minimal Assist (Min);Set-up;Other (comment)(toothpaste set-up ) (11/20/18 1045)  Grooming/Oral Hygiene Deficit: Wash/dry hands;Wash/dry face;Teeth care;Brushing hair;Other (Comment);Increased time to complete;Setup(minimal assist for combing right side hair, snarled ) (11/20/18 1045)  Bathing Assistance: Supervision;Other (comment)(commode, edge of bed, alternating modified pivot stand ) (11/20/18 1045)  Bathing Deficit:  Chest;Left arm;Right arm;Abdomen;Buttocks;Perineal area;Left upper leg;Right upper leg;Left lower leg including foot;Right lower leg including foot;Increased time to complete;Supervision/Safety;Verbal cueing;Setup;Other (Comment)(no steadying ) (11/20/18 1045)  Upper Body Dressing Assistance: Minimal Assist (Min) (11/20/18 1045)  Upper Body Dressing Deficit: Thread RUE;Thread LUE;Pull around back;Fasteners;Increased time to complete;Supervision/Safety;Verbal cueing;Setup;Other (comment)(hospital gown ) (11/20/18 1045)  Lower Body Clothing Assistance: Minimal Assist (Min);Other (comment)(R side,plan to address compensatory techniqes next session) (11/20/18 1045)  Footwear Assistance: Supervision(socks on slips on slip on clogs ) (11/20/18 1045)  Lower Body Dressing Deficit: Supervision/Safety;Verbal cueing;Setup;Increased time to complete;Don/doff R shoe;Don/doff L shoe;Thread RLE into underwear;Pull up over hips;Other (comment)(depends, no steadying asssist, modified stand during pivot ) (11/20/18 1045)  Toileting Assistance: Supervision (11/20/18 1045)  Toileting Deficit: Clothing management up;Perineal hygiene;Increased time to complete;Supervision/Safety;Verbal cueing;Setup(asssist only depends on right adjusting up, splint worn ) (11/20/18 1045)  Self Cares/ADL's Comments #1: Patient required above assist, minimal and no steadying, modified pivot stand, completing self bathing, dressing using left hand, splint worn on right, able to complete near all but minimal assist for combing right hair, and adjusting depends up on right side, otherwise completes all at supervision, slips on clogs.  (11/20/18 1045)    Household mobility  Household Mobility  Rolling: Independent (11/20/18 1045)  Supine to Sit: Independent (11/20/18 1045)  Sit to Supine: Independent (11/20/18 1045)  Sit to Stand: Modified Independent (11/20/18 1045)  Stand to Sit: Modified Independent (11/20/18 2353)  Toilet Transfers: Modified  Independent(commode, her baseline, commode at home ) (11/20/18 1045)  Transfer Equipment: pivot transfer, no assistive device, uses wheel chair at baseline  (11/20/18 1045)  Sitting - Static: Independent (11/20/18 1045)  Sitting - Dynamic: Modified Independent (11/20/18 1045)  Standing - Static: Modified Independent(modified pivot transfer stand, for ADL completion ) (11/20/18 1045)  Standing - Dynamic: Supervision(near modified independent ) (11/20/18 1045)  Household Mobility Comments #1: Patient particular, on transitioning from supine, using left hand, boosts herself over, pillows, layer of two folded blankets to her right side, particular, not wanting any of pillows or blankets moved for ease of bed mobility, manages to get herself up out of bed, pivot transfer, using left hand for weight bearing, reaching with right hand, verbalizing \"ouch\", lightly placing right on commode arm rest, right arm rest, no splint worn in supine but donned end of session, patient similarly failed to remember one other time during session getting into bed, boosting herself over blanket layer and pillows, near using right hand for boosting, pain remindered her not to bear weight, reviewed non weight bearing on right, wearing splint, splint donned, with ADLs, completed with L hand. Safely completed pivot transfer herself to/from bed to commode, similar to home, uses wheel chair at bseline, states not using 2 ww or cane, and states when she goes home, which she wants to go home by tomorrow, per statement, will use wheel chair in residence.  (11/20/18 1045)    Home Management  Home Management Skills  Home Management Skills Comments: PCW for home making or set-up, uses wheel chair, able to participate with IADLs from wheel chair level, states she plans to cook for thanksgiving with right hand, nondisplaced fracture to the right fourth and fifth metacarpal, splint, demonstrates with ADLs use of one left hand for ADL tasks  (11/20/18  1045)    Tolerance  OT Activity Tolerance  Activity Tolerance: 1:1 Activity to rest (11/20/18 1045)  Activity Tolerance Comments: fair plus, nondisplaced fracture to the right fourth and fifth metacarpal, splint  (11/20/18 1045)    Cognition  Communication/Cognition  Communication: Clear speech;Appropriate for developmental age (11/20/18 1045)  Overall Cognitive Status: Within Functional Limits (11/20/18 1045)  Arousal/Alertness: Appropriate responses to stimuli (11/20/18 1045)  Attention: Appears intact (11/20/18 1045)  Memory: Appears intact (11/20/18 1045)  Safety Judgement: Good awareness of safety precautions (11/20/18 1045)  Awareness of Deficits: Fully aware of deficits (11/20/18 1045)  Problem Solving: Able to problem solve independently (11/20/18 1045)  Interventions Used: non compliant with commands, particular, demanding  (11/20/18 1045)    Patient's Personal Goal: return home, daily caregiver, states 3 hours per day, primarily uses w/c, pivot transfesr, commode, not ambulating  (11/20/18 1045)    Therapy Goals:    Goals  Short Term Goals to Be Reviewed On: 11/26/18 (11/20/18 1045)  Short Term Goals Are The Same as Discharge Goals: Yes (11/20/18 1045)  Goal Agreement: Patient agrees with goals and treatment plan (11/20/18 1045)  Grooming Discharge Goal 1: modified independent for 2-3 grooming tasks adhering to wearing splint right forearm  (11/20/18 1045)  Bathing Discharge Goal 1: modified independent for UB bathing, adhering to wearing splint right forearm (11/20/18 1045)  Bathing Discharge Goal 2: modified independent for LB bathing, adhering to wearing splint R forearm  (11/20/18 1045)  Dressing Discharge Goal 1: modified independent for LB dressing, adhering to wearing splint right forearm, compensatory techniques carryover  (11/20/18 1045)  Dressing Discharge Goal 2: modified independent, wearing splint right forearm, compensatory techniques carryover  (11/20/18 1045)  Toileting Discharge Goal 1:  modified independent  (11/20/18 1045)  Home Setting Transfer Discharge Goal 1: modified independent, nondisplaced fracture to the right fourth and fifth metacarpal, non weight bearing pivot transfers, commode, bed, wheel chair, tub ahower chair (11/20/18 1045)    Total Treatment Time:  OT Time Spent: 82 minutes (11/20/18 1045)      See OT flowsheet for full details regarding the OT therapy provided.   R hip

## 2022-07-19 NOTE — PROGRESS NOTE ADULT - SUBJECTIVE AND OBJECTIVE BOX
Orthopedics Post-Op Check:  Patient was seen and examined at bedside. Denies CP/SOB/Dizziness, N/V/D, HA. Pain is controlled.     Vital Signs Last 24 Hrs  T(C): 36.7 (18 Jul 2022 18:30), Max: 36.7 (18 Jul 2022 10:14)  T(F): 98.1 (18 Jul 2022 18:30), Max: 98.1 (18 Jul 2022 18:30)  HR: 75 (18 Jul 2022 18:30) (65 - 85)  BP: 103/74 (18 Jul 2022 18:30) (101/68 - 119/81)  BP(mean): 82 (18 Jul 2022 18:30) (75 - 90)  RR: 17 (18 Jul 2022 18:30) (12 - 18)  SpO2: 99% (18 Jul 2022 18:30) (95% - 100%)    Parameters below as of 18 Jul 2022 17:45  Patient On (Oxygen Delivery Method): room air        Labs: AM      Physical Exam:  Gen: NAD  R LE:   Dressing C/D/I  Motor intact +EHL/FHL/TA/GS. Sensation is grossly intact.   Compartments are soft, extremities are warm, DP 2+      A/P: Patient is a 42y y/o Female s/p R total hip arthroplasty, POD #0   - Pain control  - Antibiotics - Ancef postop  - DVT ppx - Aspirin 325 BID  - Incentive spirometry  - Venodynes  - F/U AM Labs  - PT/OT/WBAT only with a walker  - Anterior precautions  - Notify Orthopedics with any questions  
Ortho Progress Note    S: Patient seen and examined. No acute events overnight. Complains of hip pain but it is controlled this AM. Denies lightheadedness/dizziness, CP/SOB. Tolerating diet.       O:  Physical Exam:  Gen: Laying in bed, NAD, alert and oriented.   Resp: Unlabored breathing  Ext: EHL/FHL/TA/Sol intact          + SILT DP/SP/RAJPUT/Sa/T          +DP, extremity WWP    Vital Signs Last 24 Hrs  T(C): 36.8 (19 Jul 2022 05:42), Max: 36.8 (18 Jul 2022 19:49)  T(F): 98.2 (19 Jul 2022 05:42), Max: 98.2 (18 Jul 2022 19:49)  HR: 70 (19 Jul 2022 05:42) (65 - 85)  BP: 100/62 (19 Jul 2022 05:42) (100/62 - 119/81)  BP(mean): 82 (18 Jul 2022 18:30) (75 - 90)  RR: 17 (19 Jul 2022 05:42) (12 - 18)  SpO2: 100% (19 Jul 2022 05:42) (95% - 100%)    Parameters below as of 19 Jul 2022 05:42  Patient On (Oxygen Delivery Method): room air

## 2022-07-22 RX ORDER — TRAMADOL HYDROCHLORIDE 50 MG/1
50 TABLET, COATED ORAL
Qty: 30 | Refills: 0 | Status: ACTIVE | COMMUNITY
Start: 2022-07-22 | End: 1900-01-01

## 2022-07-22 RX ORDER — OXYCODONE 5 MG/1
5 TABLET ORAL
Qty: 28 | Refills: 0 | Status: ACTIVE | COMMUNITY
Start: 2022-07-22 | End: 1900-01-01

## 2022-07-28 RX ORDER — CELECOXIB 100 MG/1
100 CAPSULE ORAL TWICE DAILY
Qty: 28 | Refills: 0 | Status: ACTIVE | COMMUNITY
Start: 2022-07-28 | End: 1900-01-01

## 2022-08-04 ENCOUNTER — APPOINTMENT (OUTPATIENT)
Dept: ORTHOPEDIC SURGERY | Facility: CLINIC | Age: 42
End: 2022-08-04

## 2022-08-04 VITALS
WEIGHT: 136 LBS | HEART RATE: 102 BPM | SYSTOLIC BLOOD PRESSURE: 110 MMHG | BODY MASS INDEX: 20.14 KG/M2 | DIASTOLIC BLOOD PRESSURE: 72 MMHG | HEIGHT: 69 IN

## 2022-08-04 PROCEDURE — 73502 X-RAY EXAM HIP UNI 2-3 VIEWS: CPT

## 2022-08-04 PROCEDURE — 99024 POSTOP FOLLOW-UP VISIT: CPT

## 2022-08-04 RX ORDER — OXYCODONE 5 MG/1
5 TABLET ORAL
Qty: 12 | Refills: 0 | Status: ACTIVE | COMMUNITY
Start: 2022-08-04 | End: 1900-01-01

## 2022-08-24 ENCOUNTER — NON-APPOINTMENT (OUTPATIENT)
Age: 42
End: 2022-08-24

## 2022-08-25 ENCOUNTER — APPOINTMENT (OUTPATIENT)
Dept: ORTHOPEDIC SURGERY | Facility: CLINIC | Age: 42
End: 2022-08-25

## 2022-08-25 DIAGNOSIS — M87.052 IDIOPATHIC ASEPTIC NECROSIS OF LEFT FEMUR: ICD-10-CM

## 2022-08-25 PROCEDURE — 99024 POSTOP FOLLOW-UP VISIT: CPT

## 2022-08-25 PROCEDURE — 73502 X-RAY EXAM HIP UNI 2-3 VIEWS: CPT

## 2022-08-28 PROBLEM — M87.052 AVASCULAR NECROSIS OF BONE OF LEFT HIP: Status: ACTIVE | Noted: 2022-06-09

## 2022-08-30 ENCOUNTER — OUTPATIENT (OUTPATIENT)
Dept: OUTPATIENT SERVICES | Facility: HOSPITAL | Age: 42
LOS: 1 days | End: 2022-08-30

## 2022-08-30 VITALS
TEMPERATURE: 98 F | DIASTOLIC BLOOD PRESSURE: 66 MMHG | HEIGHT: 69 IN | RESPIRATION RATE: 18 BRPM | SYSTOLIC BLOOD PRESSURE: 91 MMHG | WEIGHT: 141.1 LBS | HEART RATE: 91 BPM | OXYGEN SATURATION: 99 %

## 2022-08-30 DIAGNOSIS — M16.12 UNILATERAL PRIMARY OSTEOARTHRITIS, LEFT HIP: ICD-10-CM

## 2022-08-30 DIAGNOSIS — Z96.641 PRESENCE OF RIGHT ARTIFICIAL HIP JOINT: Chronic | ICD-10-CM

## 2022-08-30 LAB
APPEARANCE UR: CLEAR — SIGNIFICANT CHANGE UP
BILIRUB UR-MCNC: NEGATIVE — SIGNIFICANT CHANGE UP
BLD GP AB SCN SERPL QL: NEGATIVE — SIGNIFICANT CHANGE UP
COLOR SPEC: SIGNIFICANT CHANGE UP
DIFF PNL FLD: NEGATIVE — SIGNIFICANT CHANGE UP
GLUCOSE UR QL: NEGATIVE — SIGNIFICANT CHANGE UP
HCG UR QL: NEGATIVE — SIGNIFICANT CHANGE UP
HCT VFR BLD CALC: 36 % — SIGNIFICANT CHANGE UP (ref 34.5–45)
HGB BLD-MCNC: 11.1 G/DL — LOW (ref 11.5–15.5)
KETONES UR-MCNC: NEGATIVE — SIGNIFICANT CHANGE UP
LEUKOCYTE ESTERASE UR-ACNC: NEGATIVE — SIGNIFICANT CHANGE UP
MCHC RBC-ENTMCNC: 25.1 PG — LOW (ref 27–34)
MCHC RBC-ENTMCNC: 30.8 GM/DL — LOW (ref 32–36)
MCV RBC AUTO: 81.4 FL — SIGNIFICANT CHANGE UP (ref 80–100)
NITRITE UR-MCNC: NEGATIVE — SIGNIFICANT CHANGE UP
NRBC # BLD: 0 /100 WBCS — SIGNIFICANT CHANGE UP (ref 0–0)
NRBC # FLD: 0 K/UL — SIGNIFICANT CHANGE UP (ref 0–0)
PH UR: 7 — SIGNIFICANT CHANGE UP (ref 5–8)
PLATELET # BLD AUTO: 217 K/UL — SIGNIFICANT CHANGE UP (ref 150–400)
PROT UR-MCNC: NEGATIVE — SIGNIFICANT CHANGE UP
RBC # BLD: 4.42 M/UL — SIGNIFICANT CHANGE UP (ref 3.8–5.2)
RBC # FLD: 14.4 % — SIGNIFICANT CHANGE UP (ref 10.3–14.5)
RH IG SCN BLD-IMP: NEGATIVE — SIGNIFICANT CHANGE UP
SP GR SPEC: 1.01 — LOW (ref 1.01–1.05)
UROBILINOGEN FLD QL: SIGNIFICANT CHANGE UP
WBC # BLD: 5.56 K/UL — SIGNIFICANT CHANGE UP (ref 3.8–10.5)
WBC # FLD AUTO: 5.56 K/UL — SIGNIFICANT CHANGE UP (ref 3.8–10.5)

## 2022-08-30 RX ORDER — RISEDRONATE SODIUM 25.8; 4.2 MG/1; MG/1
1 TABLET, FILM COATED ORAL
Qty: 0 | Refills: 0 | DISCHARGE

## 2022-08-30 RX ORDER — CALCIUM CARBONATE 500(1250)
1 TABLET ORAL
Qty: 0 | Refills: 0 | DISCHARGE

## 2022-08-30 RX ORDER — CHOLECALCIFEROL (VITAMIN D3) 125 MCG
1 CAPSULE ORAL
Qty: 0 | Refills: 0 | DISCHARGE

## 2022-08-30 RX ORDER — SODIUM CHLORIDE 9 MG/ML
1000 INJECTION, SOLUTION INTRAVENOUS
Refills: 0 | Status: DISCONTINUED | OUTPATIENT
Start: 2022-09-06 | End: 2022-09-07

## 2022-08-30 RX ORDER — SODIUM CHLORIDE 9 MG/ML
3 INJECTION INTRAMUSCULAR; INTRAVENOUS; SUBCUTANEOUS EVERY 8 HOURS
Refills: 0 | Status: DISCONTINUED | OUTPATIENT
Start: 2022-09-06 | End: 2022-09-07

## 2022-08-30 NOTE — H&P PST ADULT - NSICDXPASTSURGICALHX_GEN_ALL_CORE_FT
PAST SURGICAL HISTORY:  No significant past surgical history PAST SURGICAL HISTORY:  Status post right hip replacement 07/18/22

## 2022-08-30 NOTE — H&P PST ADULT - PROBLEM SELECTOR PLAN 1
Schedule for left total hip replacement direct anterior approach tentatively on 09/06/22. Pre op instructions, famotidine, chlorhexidine gluconate soap given and explained. Pt verbalized understanding.  Covid-19 PCR ordered.  Pt instructed to bring urine specimen on day of surgery, urine cup given and explained. Pt verbalized understanding.

## 2022-08-30 NOTE — H&P PST ADULT - MUSCULOSKELETAL
details… left hip/no joint swelling/no joint erythema/no joint warmth/no calf tenderness/decreased ROM due to pain/decreased strength

## 2022-08-30 NOTE — H&P PST ADULT - RESPIRATORY
clear to auscultation bilaterally/no wheezes/no rales/no rhonchi/no respiratory distress/no use of accessory muscles/no subcutaneous emphysema/breath sounds equal/good air movement/respirations non-labored

## 2022-08-30 NOTE — H&P PST ADULT - ALCOHOL USE HISTORY SINGLE SELECT
PFT shows mild restriction, normal diffusion.  Does not require oxygen on exertion or at rest.     Patient does qualify for nocturnal oxygen.  Will place order for 2L at HS.      Stopped using CPAP 5 years ago, could not tolerate it.   Order for oxygen placed, please fax to appropriate DME  Please notify patient of above   yes...

## 2022-08-30 NOTE — H&P PST ADULT - HISTORY OF PRESENT ILLNESS
43 y/o female presents for pre op evaluation with C/O bilateral hip pain mostly at night x6 months. Pt stated that "last MRI showed 75% damage in the right hip and 70% in the left hip". Pt also stated that she has "avascular necrosis on both hips". S/P right total hip replacement direct anterior approach  on 07/18/22. S/P left hip X-RAY on 08/25/22. Now schedule for left THR 41 y/o female presents for pre op evaluation with long term h/o bilateral hip pain mostly at night. Pt stated that "last MRI showed 75% damage in the right hip and 70% in the left hip". Pt also stated that she has "avascular necrosis on both hips". S/P right total hip replacement direct anterior approach on 07/18/22. S/P left hip X-RAY on 08/25/22. Now schedule for left THR direct anterior approach

## 2022-09-03 LAB — SARS-COV-2 RNA SPEC QL NAA+PROBE: SIGNIFICANT CHANGE UP

## 2022-09-05 ENCOUNTER — TRANSCRIPTION ENCOUNTER (OUTPATIENT)
Age: 42
End: 2022-09-05

## 2022-09-06 ENCOUNTER — APPOINTMENT (OUTPATIENT)
Dept: ORTHOPEDIC SURGERY | Facility: HOSPITAL | Age: 42
End: 2022-09-06

## 2022-09-06 ENCOUNTER — INPATIENT (INPATIENT)
Facility: HOSPITAL | Age: 42
LOS: 0 days | Discharge: HOME | End: 2022-09-07
Attending: ORTHOPAEDIC SURGERY | Admitting: ORTHOPAEDIC SURGERY

## 2022-09-06 VITALS
DIASTOLIC BLOOD PRESSURE: 83 MMHG | RESPIRATION RATE: 17 BRPM | TEMPERATURE: 98 F | SYSTOLIC BLOOD PRESSURE: 113 MMHG | OXYGEN SATURATION: 100 % | WEIGHT: 141.1 LBS | HEART RATE: 89 BPM | HEIGHT: 69 IN

## 2022-09-06 DIAGNOSIS — M16.12 UNILATERAL PRIMARY OSTEOARTHRITIS, LEFT HIP: ICD-10-CM

## 2022-09-06 DIAGNOSIS — Z96.642 PRESENCE OF LEFT ARTIFICIAL HIP JOINT: ICD-10-CM

## 2022-09-06 DIAGNOSIS — Z96.641 PRESENCE OF RIGHT ARTIFICIAL HIP JOINT: Chronic | ICD-10-CM

## 2022-09-06 LAB — HCG UR QL: NEGATIVE — SIGNIFICANT CHANGE UP

## 2022-09-06 PROCEDURE — 73501 X-RAY EXAM HIP UNI 1 VIEW: CPT | Mod: 26,LT

## 2022-09-06 PROCEDURE — 27130 TOTAL HIP ARTHROPLASTY: CPT | Mod: 79,LT

## 2022-09-06 DEVICE — STEM NECK ANG HIP V40 127D SZ 6 35X111MM: Type: IMPLANTABLE DEVICE | Site: LEFT | Status: FUNCTIONAL

## 2022-09-06 DEVICE — SHELL ACET TRIDENT II D 48MM: Type: IMPLANTABLE DEVICE | Site: LEFT | Status: FUNCTIONAL

## 2022-09-06 DEVICE — HEAD BIOLOX DELTA CERAMIC V40 32MM PLUS0: Type: IMPLANTABLE DEVICE | Site: LEFT | Status: FUNCTIONAL

## 2022-09-06 DEVICE — LINER ACET TRIDENT X3 0 DEG 32MM D: Type: IMPLANTABLE DEVICE | Site: LEFT | Status: FUNCTIONAL

## 2022-09-06 RX ORDER — OXYCODONE HYDROCHLORIDE 5 MG/1
5 TABLET ORAL EVERY 4 HOURS
Refills: 0 | Status: DISCONTINUED | OUTPATIENT
Start: 2022-09-06 | End: 2022-09-07

## 2022-09-06 RX ORDER — ASPIRIN/CALCIUM CARB/MAGNESIUM 324 MG
325 TABLET ORAL
Refills: 0 | Status: DISCONTINUED | OUTPATIENT
Start: 2022-09-06 | End: 2022-09-06

## 2022-09-06 RX ORDER — SENNA PLUS 8.6 MG/1
2 TABLET ORAL AT BEDTIME
Refills: 0 | Status: DISCONTINUED | OUTPATIENT
Start: 2022-09-06 | End: 2022-09-07

## 2022-09-06 RX ORDER — SODIUM CHLORIDE 9 MG/ML
1000 INJECTION, SOLUTION INTRAVENOUS ONCE
Refills: 0 | Status: COMPLETED | OUTPATIENT
Start: 2022-09-06 | End: 2022-09-06

## 2022-09-06 RX ORDER — FENTANYL CITRATE 50 UG/ML
50 INJECTION INTRAVENOUS
Refills: 0 | Status: DISCONTINUED | OUTPATIENT
Start: 2022-09-06 | End: 2022-09-06

## 2022-09-06 RX ORDER — PANTOPRAZOLE SODIUM 20 MG/1
40 TABLET, DELAYED RELEASE ORAL
Refills: 0 | Status: DISCONTINUED | OUTPATIENT
Start: 2022-09-06 | End: 2022-09-07

## 2022-09-06 RX ORDER — ASPIRIN/CALCIUM CARB/MAGNESIUM 324 MG
325 TABLET ORAL
Refills: 0 | Status: DISCONTINUED | OUTPATIENT
Start: 2022-09-06 | End: 2022-09-07

## 2022-09-06 RX ORDER — FENTANYL CITRATE 50 UG/ML
25 INJECTION INTRAVENOUS
Refills: 0 | Status: DISCONTINUED | OUTPATIENT
Start: 2022-09-06 | End: 2022-09-06

## 2022-09-06 RX ORDER — PANTOPRAZOLE SODIUM 20 MG/1
40 TABLET, DELAYED RELEASE ORAL ONCE
Refills: 0 | Status: COMPLETED | OUTPATIENT
Start: 2022-09-06 | End: 2022-09-06

## 2022-09-06 RX ORDER — SODIUM CHLORIDE 9 MG/ML
1000 INJECTION, SOLUTION INTRAVENOUS ONCE
Refills: 0 | Status: COMPLETED | OUTPATIENT
Start: 2022-09-06 | End: 2022-09-07

## 2022-09-06 RX ORDER — CEFAZOLIN SODIUM 1 G
2000 VIAL (EA) INJECTION EVERY 8 HOURS
Refills: 0 | Status: COMPLETED | OUTPATIENT
Start: 2022-09-06 | End: 2022-09-07

## 2022-09-06 RX ORDER — TRAMADOL HYDROCHLORIDE 50 MG/1
50 TABLET ORAL ONCE
Refills: 0 | Status: DISCONTINUED | OUTPATIENT
Start: 2022-09-06 | End: 2022-09-06

## 2022-09-06 RX ORDER — ACETAMINOPHEN 500 MG
975 TABLET ORAL EVERY 8 HOURS
Refills: 0 | Status: DISCONTINUED | OUTPATIENT
Start: 2022-09-07 | End: 2022-09-07

## 2022-09-06 RX ORDER — ACETAMINOPHEN 500 MG
1000 TABLET ORAL ONCE
Refills: 0 | Status: COMPLETED | OUTPATIENT
Start: 2022-09-07 | End: 2022-09-07

## 2022-09-06 RX ORDER — ACETAMINOPHEN 500 MG
1000 TABLET ORAL EVERY 8 HOURS
Refills: 0 | Status: COMPLETED | OUTPATIENT
Start: 2022-09-06 | End: 2022-09-06

## 2022-09-06 RX ORDER — MAGNESIUM HYDROXIDE 400 MG/1
30 TABLET, CHEWABLE ORAL DAILY
Refills: 0 | Status: DISCONTINUED | OUTPATIENT
Start: 2022-09-06 | End: 2022-09-07

## 2022-09-06 RX ORDER — POLYETHYLENE GLYCOL 3350 17 G/17G
17 POWDER, FOR SOLUTION ORAL AT BEDTIME
Refills: 0 | Status: DISCONTINUED | OUTPATIENT
Start: 2022-09-06 | End: 2022-09-07

## 2022-09-06 RX ORDER — ONDANSETRON 8 MG/1
4 TABLET, FILM COATED ORAL EVERY 6 HOURS
Refills: 0 | Status: DISCONTINUED | OUTPATIENT
Start: 2022-09-06 | End: 2022-09-07

## 2022-09-06 RX ORDER — ONDANSETRON 8 MG/1
4 TABLET, FILM COATED ORAL ONCE
Refills: 0 | Status: DISCONTINUED | OUTPATIENT
Start: 2022-09-06 | End: 2022-09-06

## 2022-09-06 RX ORDER — OXYCODONE HYDROCHLORIDE 5 MG/1
10 TABLET ORAL EVERY 4 HOURS
Refills: 0 | Status: DISCONTINUED | OUTPATIENT
Start: 2022-09-06 | End: 2022-09-07

## 2022-09-06 RX ORDER — OXYCODONE HYDROCHLORIDE 5 MG/1
5 TABLET ORAL ONCE
Refills: 0 | Status: DISCONTINUED | OUTPATIENT
Start: 2022-09-06 | End: 2022-09-06

## 2022-09-06 RX ORDER — KETOROLAC TROMETHAMINE 30 MG/ML
15 SYRINGE (ML) INJECTION EVERY 6 HOURS
Refills: 0 | Status: DISCONTINUED | OUTPATIENT
Start: 2022-09-06 | End: 2022-09-07

## 2022-09-06 RX ADMIN — OXYCODONE HYDROCHLORIDE 10 MILLIGRAM(S): 5 TABLET ORAL at 15:45

## 2022-09-06 RX ADMIN — Medication 100 MILLIGRAM(S): at 17:13

## 2022-09-06 RX ADMIN — Medication 400 MILLIGRAM(S): at 19:07

## 2022-09-06 RX ADMIN — SODIUM CHLORIDE 1000 MILLILITER(S): 9 INJECTION, SOLUTION INTRAVENOUS at 17:13

## 2022-09-06 RX ADMIN — Medication 325 MILLIGRAM(S): at 17:13

## 2022-09-06 RX ADMIN — SODIUM CHLORIDE 3 MILLILITER(S): 9 INJECTION INTRAMUSCULAR; INTRAVENOUS; SUBCUTANEOUS at 14:05

## 2022-09-06 RX ADMIN — SODIUM CHLORIDE 30 MILLILITER(S): 9 INJECTION, SOLUTION INTRAVENOUS at 08:20

## 2022-09-06 RX ADMIN — OXYCODONE HYDROCHLORIDE 10 MILLIGRAM(S): 5 TABLET ORAL at 22:47

## 2022-09-06 RX ADMIN — Medication 1 TABLET(S): at 15:58

## 2022-09-06 RX ADMIN — POLYETHYLENE GLYCOL 3350 17 GRAM(S): 17 POWDER, FOR SOLUTION ORAL at 22:47

## 2022-09-06 RX ADMIN — OXYCODONE HYDROCHLORIDE 10 MILLIGRAM(S): 5 TABLET ORAL at 15:25

## 2022-09-06 RX ADMIN — SENNA PLUS 2 TABLET(S): 8.6 TABLET ORAL at 22:47

## 2022-09-06 RX ADMIN — SODIUM CHLORIDE 1000 MILLILITER(S): 9 INJECTION, SOLUTION INTRAVENOUS at 12:07

## 2022-09-06 RX ADMIN — SODIUM CHLORIDE 3 MILLILITER(S): 9 INJECTION INTRAMUSCULAR; INTRAVENOUS; SUBCUTANEOUS at 22:32

## 2022-09-06 RX ADMIN — OXYCODONE HYDROCHLORIDE 10 MILLIGRAM(S): 5 TABLET ORAL at 23:47

## 2022-09-06 RX ADMIN — Medication 15 MILLIGRAM(S): at 22:47

## 2022-09-06 RX ADMIN — PANTOPRAZOLE SODIUM 40 MILLIGRAM(S): 20 TABLET, DELAYED RELEASE ORAL at 08:24

## 2022-09-06 NOTE — ASU PREOP CHECKLIST - CHLOROHEXIDINE WASH 3
Received fax from [facility]. Per 7-16-22 E.R. note patient had fairchild catheter placed. Patient advised to take Rocephin, Tamsulosin, and Pyridium. Patient advised to follow up for Trial of Void/ Catheter removal.     Called patient. Left message for patient to call back. Calling to verify patient is taking Tamsulosin and schedule for a clinical visit for Trial of Void/ Catheter removal and a follow up with Dr. Javier a week later.      06-Sep-2022 06:00

## 2022-09-06 NOTE — PHYSICAL THERAPY INITIAL EVALUATION ADULT - GAIT DEVIATIONS NOTED, PT EVAL
decreased kamryn/increased time in double stance/decreased velocity of limb motion/decreased step length/decreased stride length/decreased weight-shifting ability

## 2022-09-06 NOTE — PATIENT PROFILE ADULT - FALL HARM RISK - HARM RISK INTERVENTIONS
Assistance with ambulation/Assistance OOB with selected safe patient handling equipment/Communicate Risk of Fall with Harm to all staff/Discuss with provider need for PT consult/Monitor gait and stability/Provide patient with walking aids - walker, cane, crutches/Reinforce activity limits and safety measures with patient and family/Sit up slowly, dangle for a short time, stand at bedside before walking/Tailored Fall Risk Interventions/Use of alarms - bed, chair and/or voice tab/Visual Cue: Yellow wristband and red socks/Bed in lowest position, wheels locked, appropriate side rails in place/Call bell, personal items and telephone in reach/Instruct patient to call for assistance before getting out of bed or chair/Non-slip footwear when patient is out of bed/[place] to call system/Physically safe environment - no spills, clutter or unnecessary equipment/Purposeful Proactive Rounding/Room/bathroom lighting operational, light cord in reach

## 2022-09-06 NOTE — PHYSICAL THERAPY INITIAL EVALUATION ADULT - PERTINENT HX OF CURRENT PROBLEM, REHAB EVAL
patient is a 42 year old female s/p L MARTIN due to AVN. Of note, patient previously had R MARTIN 7 weeks ago due to AVN

## 2022-09-07 ENCOUNTER — TRANSCRIPTION ENCOUNTER (OUTPATIENT)
Age: 42
End: 2022-09-07

## 2022-09-07 VITALS
HEART RATE: 81 BPM | SYSTOLIC BLOOD PRESSURE: 101 MMHG | TEMPERATURE: 98 F | OXYGEN SATURATION: 100 % | DIASTOLIC BLOOD PRESSURE: 59 MMHG | RESPIRATION RATE: 16 BRPM

## 2022-09-07 LAB
ANION GAP SERPL CALC-SCNC: 12 MMOL/L — SIGNIFICANT CHANGE UP (ref 7–14)
BUN SERPL-MCNC: 7 MG/DL — SIGNIFICANT CHANGE UP (ref 7–23)
CALCIUM SERPL-MCNC: 9.1 MG/DL — SIGNIFICANT CHANGE UP (ref 8.4–10.5)
CHLORIDE SERPL-SCNC: 103 MMOL/L — SIGNIFICANT CHANGE UP (ref 98–107)
CO2 SERPL-SCNC: 22 MMOL/L — SIGNIFICANT CHANGE UP (ref 22–31)
CREAT SERPL-MCNC: 0.58 MG/DL — SIGNIFICANT CHANGE UP (ref 0.5–1.3)
EGFR: 114 ML/MIN/1.73M2 — SIGNIFICANT CHANGE UP
GLUCOSE SERPL-MCNC: 104 MG/DL — HIGH (ref 70–99)
HCT VFR BLD CALC: 28.6 % — LOW (ref 34.5–45)
HGB BLD-MCNC: 8.8 G/DL — LOW (ref 11.5–15.5)
MCHC RBC-ENTMCNC: 24.5 PG — LOW (ref 27–34)
MCHC RBC-ENTMCNC: 30.8 GM/DL — LOW (ref 32–36)
MCV RBC AUTO: 79.7 FL — LOW (ref 80–100)
NRBC # BLD: 0 /100 WBCS — SIGNIFICANT CHANGE UP (ref 0–0)
NRBC # FLD: 0 K/UL — SIGNIFICANT CHANGE UP (ref 0–0)
PLATELET # BLD AUTO: 205 K/UL — SIGNIFICANT CHANGE UP (ref 150–400)
POTASSIUM SERPL-MCNC: 3.9 MMOL/L — SIGNIFICANT CHANGE UP (ref 3.5–5.3)
POTASSIUM SERPL-SCNC: 3.9 MMOL/L — SIGNIFICANT CHANGE UP (ref 3.5–5.3)
RBC # BLD: 3.59 M/UL — LOW (ref 3.8–5.2)
RBC # FLD: 14.3 % — SIGNIFICANT CHANGE UP (ref 10.3–14.5)
SODIUM SERPL-SCNC: 137 MMOL/L — SIGNIFICANT CHANGE UP (ref 135–145)
WBC # BLD: 10.22 K/UL — SIGNIFICANT CHANGE UP (ref 3.8–10.5)
WBC # FLD AUTO: 10.22 K/UL — SIGNIFICANT CHANGE UP (ref 3.8–10.5)

## 2022-09-07 RX ORDER — PANTOPRAZOLE SODIUM 20 MG/1
1 TABLET, DELAYED RELEASE ORAL
Qty: 30 | Refills: 0
Start: 2022-09-07 | End: 2022-10-06

## 2022-09-07 RX ORDER — SENNA PLUS 8.6 MG/1
2 TABLET ORAL
Qty: 28 | Refills: 0
Start: 2022-09-07 | End: 2022-09-20

## 2022-09-07 RX ORDER — ASPIRIN/CALCIUM CARB/MAGNESIUM 324 MG
1 TABLET ORAL
Qty: 84 | Refills: 0
Start: 2022-09-07 | End: 2022-10-18

## 2022-09-07 RX ORDER — TRAMADOL HYDROCHLORIDE 50 MG/1
1 TABLET ORAL
Qty: 21 | Refills: 0
Start: 2022-09-07 | End: 2022-09-13

## 2022-09-07 RX ORDER — ASPIRIN/CALCIUM CARB/MAGNESIUM 324 MG
0 TABLET ORAL
Qty: 0 | Refills: 0 | DISCHARGE

## 2022-09-07 RX ORDER — ACETAMINOPHEN 500 MG
2 TABLET ORAL
Qty: 0 | Refills: 0 | DISCHARGE

## 2022-09-07 RX ORDER — OXYCODONE HYDROCHLORIDE 5 MG/1
1 TABLET ORAL
Qty: 42 | Refills: 0
Start: 2022-09-07 | End: 2022-09-13

## 2022-09-07 RX ORDER — ACETAMINOPHEN 500 MG
3 TABLET ORAL
Qty: 63 | Refills: 0
Start: 2022-09-07 | End: 2022-09-13

## 2022-09-07 RX ADMIN — Medication 15 MILLIGRAM(S): at 04:24

## 2022-09-07 RX ADMIN — SODIUM CHLORIDE 3 MILLILITER(S): 9 INJECTION INTRAMUSCULAR; INTRAVENOUS; SUBCUTANEOUS at 04:45

## 2022-09-07 RX ADMIN — OXYCODONE HYDROCHLORIDE 5 MILLIGRAM(S): 5 TABLET ORAL at 09:43

## 2022-09-07 RX ADMIN — Medication 400 MILLIGRAM(S): at 01:32

## 2022-09-07 RX ADMIN — Medication 100 MILLIGRAM(S): at 01:32

## 2022-09-07 RX ADMIN — OXYCODONE HYDROCHLORIDE 5 MILLIGRAM(S): 5 TABLET ORAL at 10:40

## 2022-09-07 RX ADMIN — Medication 1 TABLET(S): at 11:37

## 2022-09-07 RX ADMIN — SODIUM CHLORIDE 1000 MILLILITER(S): 9 INJECTION, SOLUTION INTRAVENOUS at 04:28

## 2022-09-07 RX ADMIN — Medication 325 MILLIGRAM(S): at 04:24

## 2022-09-07 RX ADMIN — Medication 975 MILLIGRAM(S): at 09:43

## 2022-09-07 RX ADMIN — PANTOPRAZOLE SODIUM 40 MILLIGRAM(S): 20 TABLET, DELAYED RELEASE ORAL at 07:52

## 2022-09-07 RX ADMIN — Medication 15 MILLIGRAM(S): at 11:37

## 2022-09-07 RX ADMIN — SODIUM CHLORIDE 3 MILLILITER(S): 9 INJECTION INTRAMUSCULAR; INTRAVENOUS; SUBCUTANEOUS at 13:13

## 2022-09-07 NOTE — DISCHARGE NOTE PROVIDER - NSDCMRMEDTOKEN_GEN_ALL_CORE_FT
acetaminophen 325 mg oral tablet: 3 tab(s) orally every 8 hours MDD:8  aspirin 325 mg oral delayed release tablet: 1 tab(s) orally 2 times a day MDD:2  Calcium plus W/Mag/ Vit. D3 1000m tab(s) orally once a day  oxyCODONE 5 mg oral tablet: 1-2 tab(s) orally every 4 hours, As needed, Moderate Pain (4 - 6) MDD:6  pantoprazole 40 mg oral delayed release tablet: 1 tab(s) orally once a day (before a meal) MDD:1  risedronate 35 mg oral tablet: 1 tab(s) orally once a week  senna leaf extract oral tablet: 2 tab(s) orally once a day (at bedtime) MDD:2  traMADol 50 mg oral tablet: 1 tab(s) orally every 8 hours, As Needed -Mild Pain (1 - 3) MDD:3 tabs

## 2022-09-07 NOTE — DISCHARGE NOTE NURSING/CASE MANAGEMENT/SOCIAL WORK - PATIENT PORTAL LINK FT
You can access the FollowMyHealth Patient Portal offered by [facility] by registering at the following website: [email or web address]. By joining [employer]’s FollowMyHealth portal, you will also be able to view your health information using other applications (apps) compatible with our system.

## 2022-09-07 NOTE — PROGRESS NOTE ADULT - SUBJECTIVE AND OBJECTIVE BOX
Orthopaedic Surgery Progress Note    Subjective:   Patient seen and examined this today. No acute events overnight. Pain is well controlled. Denies f/c, chest pain, sob, dizziness.    Objective:  T(C): 36.4 (09-07-22 @ 05:00), Max: 36.9 (09-06-22 @ 21:13)  HR: 70 (09-07-22 @ 05:00) (68 - 89)  BP: 108/65 (09-07-22 @ 05:00) (101/65 - 132/71)  RR: 18 (09-07-22 @ 05:00) (14 - 22)  SpO2: 98% (09-07-22 @ 05:00) (98% - 100%)  Wt(kg): --    09-06 @ 07:01  -  09-07 @ 06:26  --------------------------------------------------------  IN: 1270 mL / OUT: 3000 mL / NET: -1730 mL        PE    NAD  LLE:   dressing C/D/I  motor intact GS/TA/EHL  SILT S/S/SP/DP  WWP                    
Ortho Post-op    Patient was seen and examined at bedside. Denies CP/SOB/Dizziness/N/V/D/HA. Pain is controlled.     Vital Signs Last 24 Hrs  T(C): 36.5 (06 Sep 2022 11:45), Max: 36.6 (06 Sep 2022 07:47)  T(F): 97.7 (06 Sep 2022 11:45), Max: 97.9 (06 Sep 2022 07:47)  HR: 76 (06 Sep 2022 14:45) (68 - 89)  BP: 114/77 (06 Sep 2022 14:45) (101/65 - 132/71)  BP(mean): 85 (06 Sep 2022 14:45) (73 - 93)  RR: 21 (06 Sep 2022 14:45) (14 - 21)  SpO2: 100% (06 Sep 2022 14:45) (100% - 100%)    Parameters below as of 06 Sep 2022 12:00  Patient On (Oxygen Delivery Method): room air        GEN: NAD  LLE: Dressing C/D/I.    Bilat LE: Motor intact + EHL/FHL/TA/GS. Sensation is grossly intact distal . Extremity warm. Compartments are soft. DP 1+    Labs:                A/P: Patient is a 42y y/o Female s/p L anterior MARTIN, POD#0    -Pain control/analgesia  -Inc spirometry  -Venodynes/foot pumps  -F/U AM Labs  -PT/OT/WBAT  -Antibiotic periop-ancef  -Anticoagulation-A325mg BID  -Hip precautions-anterior  -Becker placed for urinary retention. Patient had >1300 on bladder scan per nurse.  -Continue to monitor. Notify ortho with questions.

## 2022-09-07 NOTE — DISCHARGE NOTE PROVIDER - NSDCFUSCHEDAPPT_GEN_ALL_CORE_FT
Hamida Quinones  [facility]  ORTHOSURG [address] [facility]  Scheduled Appointment: 09/22/2022

## 2022-09-07 NOTE — DISCHARGE NOTE PROVIDER - NSDCCPTREATMENT_GEN_ALL_CORE_FT
PRINCIPAL PROCEDURE  Procedure: Total hip replacement  Findings and Treatment: left anterior MARTIN

## 2022-09-07 NOTE — DISCHARGE NOTE PROVIDER - HOSPITAL COURSE
Pt is a 43yo female s/p left anterior MARTIN without any intraoperative complications.  Pt is doing well and stable for discharge.  Pt is tolerating physical therapy: WBAT with cane/walker, TOTAL ANTERIOR HIP PRECAUTIONS, gait training.  Leave dressing on until post op office visit(POD#14).  Have sutures/staples removed POD#14 if present.  Pt is on enteric coated ASA 325mg PO BID for DVT prophylaxis take for 6 weeks unless otherwise directed by surgeon. Patient has been instructed to follow up with Dr. Quinones in two weeks.  Follow up with primary care doctor in 1-2 weeks for continuity of care. The patient met criteria for discharge before the 2nd night of stay. The patient was safely and appropriately discharged home.

## 2022-09-07 NOTE — DISCHARGE NOTE NURSING/CASE MANAGEMENT/SOCIAL WORK - NSDCPNINST_GEN_ALL_CORE
Maintain hip incision and dressing clean dry and intact. Call MD with any signs of infection ie fever, redness, drainage, or with signs of bleeding, unrelieved increased pain, or persistent nausea. Continue to drink plenty of fluids. Avoid strenuous activity and constipation which may be a side effect from taking certain medications and narcotics.  Total hip precautions reviewed with patient, safety and fall prevention measures reinforced.

## 2022-09-07 NOTE — PROGRESS NOTE ADULT - ASSESSMENT
42y Female s/p MARTIN, left, anterior approach  - d/c alejandro this morning  - Pain control  - WBAT  - Anterior dislocation precautions  - PT/OT/OOB  - DVT ppx  - Dispo planning: home

## 2022-09-07 NOTE — DISCHARGE NOTE PROVIDER - CARE PROVIDER_API CALL
Hamida Quinones)  Orthopaedic Surgery  [address], [address]  [place] [address]  Phone: [phone]  Fax: [phone]  Established Patient  Follow Up Time:

## 2022-09-22 ENCOUNTER — APPOINTMENT (OUTPATIENT)
Dept: ORTHOPEDIC SURGERY | Facility: CLINIC | Age: 42
End: 2022-09-22

## 2022-09-22 PROCEDURE — 73502 X-RAY EXAM HIP UNI 2-3 VIEWS: CPT

## 2022-09-22 PROCEDURE — 99024 POSTOP FOLLOW-UP VISIT: CPT

## 2022-11-16 ENCOUNTER — NON-APPOINTMENT (OUTPATIENT)
Age: 42
End: 2022-11-16

## 2022-12-15 ENCOUNTER — APPOINTMENT (OUTPATIENT)
Dept: ORTHOPEDIC SURGERY | Facility: CLINIC | Age: 42
End: 2022-12-15
Payer: COMMERCIAL

## 2022-12-15 VITALS — WEIGHT: 139 LBS | HEIGHT: 69 IN | BODY MASS INDEX: 20.59 KG/M2

## 2022-12-15 PROCEDURE — 72170 X-RAY EXAM OF PELVIS: CPT

## 2022-12-15 PROCEDURE — 99214 OFFICE O/P EST MOD 30 MIN: CPT

## 2022-12-16 ENCOUNTER — APPOINTMENT (OUTPATIENT)
Dept: ORTHOPEDIC SURGERY | Facility: CLINIC | Age: 42
End: 2022-12-16

## 2023-02-22 NOTE — OCCUPATIONAL THERAPY INITIAL EVALUATION ADULT - EATING, PREVIOUS LEVEL OF FUNCTION, OT EVAL
independent Oxybutynin Counseling:  I discussed with the patient the risks of oxybutynin including but not limited to skin rash, drowsiness, dry mouth, difficulty urinating, and blurred vision.

## 2023-08-11 NOTE — PHYSICAL THERAPY INITIAL EVALUATION ADULT - GENERAL OBSERVATIONS, REHAB EVAL
Eye exam faxed back, scanned into chart and Hm was updated  
patient received semisupine on transport stretcher in NAD +RN Nataliia fernandes present, patient  present.

## 2023-11-03 ENCOUNTER — APPOINTMENT (OUTPATIENT)
Age: 43
End: 2023-11-03
Payer: COMMERCIAL

## 2023-11-03 PROCEDURE — D7210: CPT

## 2023-11-08 NOTE — PATIENT PROFILE ADULT - FUNCTIONAL ASSESSMENT - BASIC MOBILITY 6.
Activity  For the rest of the day, do NOT:  Work if you had an epidural steroid injection or IV sedation  Stay alone if you had an epidural steroid injection or IV sedation  Drive a car if you had an epidural steroid injection or IV sedation  Operate electrical/power tools or appliances if you had an epidural steroid injection or IV sedation  Drink alcohol  Sign any legal papers  Apply heat to the injection site    You may:  Apply ice to the injection site for up to 15 minutes at a time for comfort as long as ice is sealed in a water-tight bag so that injection site or dressings do not become wet.  Resume activity as tolerated today  Resume your pre-procedure activity or restrictions tomorrow.    Dressing Care  Keep injection site clean and dry.   You may use an ice pack on and off over the injection site for the next 24 hours while awake.    Bathing  You may shower tomorrow and bathe in two days.    Diet  Resume your normal pre-procedure diet today.  If you are Diabetic and received steroids today, please monitor your blood sugars throughout the day for at least the next 4 days and follow a strict diabetic diet and avoid sugars, and simple carbohydrates such as sweets, candy, regular soda. Focus on Vegetables, proteins and complex carbohydrates.    Medication  Continue home medications, unless otherwise instructed.  If you had an Epidural Steroid injection please do not take NSAIDS or blood thinning medicine for 24 hours (Aspirin, Mobic, Aleve, Ibuprofen, Diclofenac, Plavix, Xarelto, Coumadin, fish oil, ect.)     Follow Up  We will call you tomorrow to review your pain diary entries, evaluate the effectiveness of the procedure, and plan for next steps.      Call  Bridgette Rodriguez MD office at [phone] if you have the following:  Drainage, increase in redness and/or swelling from the injection site. If there is a dressing/Band-Aid over the injection site, some spotting of the dressings over the injection site  is normal, but drainage that pools, soaks, or drips from the dressing is not normal.  Temperature above 101 degrees, chills, sweats, or body aches.  Numbness or weakness of your legs or arms, different than before the injection.  Severe headache.    4 = No assist / stand by assistance

## 2023-11-10 ENCOUNTER — APPOINTMENT (OUTPATIENT)
Age: 43
End: 2023-11-10
Payer: COMMERCIAL

## 2023-11-10 PROCEDURE — XXXXX: CPT | Mod: 1L

## 2023-12-19 NOTE — ASU PATIENT PROFILE, ADULT - NS PRO LAST MENSTRUAL
Group Topic: BH Process Group     Date: 12/19/2023  Start Time: 11:00 AM  End Time: 12:00 PM  Facilitators: Don Bahena LPC    Focus: Recovery  Number in attendance: 8    Each patient processed personal concerns, stressors, and therapeutic assignments. Therapist and peer feedback is welcomed and given. Maladaptive coping skills are challenged with healthier coping skills.    Method: Group  Attendance: Present  Participation: Active  Patient Response: Asked questions and Attentive  Mood: Anxious  Affect: Type: Anxious   Range: Blunted/flat   Congruency: Congruent   Stability: Stable  Behavior/Socialization: Engaged  Thought Process: Chandler thinking  Task Performance: Follows directions  Patient Evaluation: Attends - no participation    Pt reports things are \"getting a little better\" and filled out paperwork for her upcoming trip, and contacted her school. Pt used thought challenging, and gratitude as coping skills.    Goal(s): Make dinner with mom, gratitude practice, art project (eye glass cases), walk the dog      6/25/2022

## 2024-01-01 NOTE — DISCHARGE NOTE PROVIDER - REASON FOR ADMISSION
[phone] (home)   SPOKE WITH PATIENT  ORDER PLACED FAXED TO [facility]
Order placed (speech therapy) [facility]
Pt is calling lon wanting to add speech therapy as well. Please call pt once this order has been placed x faxed.     Please advise
Pt is calling wanting to know if she can have an order for PT, OT, Skilled nursing through [address] [place] [address].     Pt stated she needs this
Yes. I think we tried to get this send under my name (as it was previously under Dr. Mignon Gupta). Please double check.
right total hip arthroplasty 
I will STOP taking the medications listed below when I get home from the hospital:  None

## 2024-01-25 ENCOUNTER — APPOINTMENT (OUTPATIENT)
Dept: ORTHOPEDIC SURGERY | Facility: CLINIC | Age: 44
End: 2024-01-25
Payer: COMMERCIAL

## 2024-01-25 DIAGNOSIS — Z96.641 PRESENCE OF RIGHT ARTIFICIAL HIP JOINT: ICD-10-CM

## 2024-01-25 DIAGNOSIS — Z96.642 PRESENCE OF LEFT ARTIFICIAL HIP JOINT: ICD-10-CM

## 2024-01-25 PROCEDURE — 72170 X-RAY EXAM OF PELVIS: CPT

## 2024-01-25 PROCEDURE — 99214 OFFICE O/P EST MOD 30 MIN: CPT

## 2024-05-18 ENCOUNTER — NON-APPOINTMENT (OUTPATIENT)
Age: 44
End: 2024-05-18

## 2024-09-19 ENCOUNTER — APPOINTMENT (OUTPATIENT)
Dept: ORTHOPEDIC SURGERY | Facility: CLINIC | Age: 44
End: 2024-09-19
Payer: COMMERCIAL

## 2024-09-19 DIAGNOSIS — M54.50 LOW BACK PAIN, UNSPECIFIED: ICD-10-CM

## 2024-09-19 DIAGNOSIS — Z96.641 PRESENCE OF RIGHT ARTIFICIAL HIP JOINT: ICD-10-CM

## 2024-09-19 DIAGNOSIS — Z96.642 PRESENCE OF LEFT ARTIFICIAL HIP JOINT: ICD-10-CM

## 2024-09-19 PROCEDURE — 72100 X-RAY EXAM L-S SPINE 2/3 VWS: CPT

## 2024-09-19 PROCEDURE — 99214 OFFICE O/P EST MOD 30 MIN: CPT

## 2024-09-19 PROCEDURE — 72170 X-RAY EXAM OF PELVIS: CPT

## 2025-02-19 NOTE — PHYSICAL THERAPY INITIAL EVALUATION ADULT - MD/RN NOTIFIED
Attempted to make supportive call to patient. Left voicemail to give NN call back at her convenience to check in if she would like. Also mentioned we can discuss Survivorship as she is nearing the end of her treatment. Contact information provided.    Will follow PRN/per referral.      yes

## 2025-04-03 NOTE — PACU DISCHARGE NOTE - MENTAL STATUS: PATIENT PARTICIPATION
Spoke with patient confirmed that she needs to call [facility] gave out number to see when will her shipment will start pt was given a call but didn't answer from [facility]    Awake

## (undated) DEVICE — SOL IRR POUR NS 0.9% 1500ML

## (undated) DEVICE — ELCTR AQUAMANTYS BIPOLAR SEALER 6.0

## (undated) DEVICE — POSITIONER STRAP ARMBOARD VELCRO TS-30

## (undated) DEVICE — SUT VICRYL 0 27" OS-6 UNDYED

## (undated) DEVICE — PACK TOTAL JOINT

## (undated) DEVICE — DRAPE LARGE SHEET 72X85"

## (undated) DEVICE — SOL IRR POUR H2O 1500ML

## (undated) DEVICE — DRSG STOCKINETTE IMPERVIOUS XL

## (undated) DEVICE — DRAPE 3/4 SHEET 52X76"

## (undated) DEVICE — ELCTR BOVIE BLADE 3/4" EXTENDED LENGTH 6"

## (undated) DEVICE — DRAPE SHOWER CURTAIN ISOLATION

## (undated) DEVICE — PACK LIJ BASIC ORTHO

## (undated) DEVICE — DRSG COBAN 6"

## (undated) DEVICE — WOUND IRR IRRISEPT W 0.5 CHG

## (undated) DEVICE — PROTECTOR HEEL / ELBOW

## (undated) DEVICE — SUT QUILL PDO 1 30CM T9 DA

## (undated) DEVICE — SAW BLADE STRYKER SAGITTAL 3 HOLE OSCILLATING

## (undated) DEVICE — DRSG DERMABOND 0.7ML

## (undated) DEVICE — DRSG TAPE MICROFOAM 4"

## (undated) DEVICE — DRAPE IOBAN 33" X 23"

## (undated) DEVICE — SYR LUER LOK 20CC

## (undated) DEVICE — SUT QUILL MONODERM 3-0 30CM 19MM

## (undated) DEVICE — DRAPE 1/2 SHEET 40X57"

## (undated) DEVICE — SAW BLADE STRYKER THCK LONG 1.24X83.5X18.5MM

## (undated) DEVICE — DRAPE IOBAN 23" X 23"

## (undated) DEVICE — NDL HYPO SAFE 21G X 1.5" (GREEN)

## (undated) DEVICE — LABELS BLANK W PEN

## (undated) DEVICE — VENODYNE/SCD SLEEVE CALF MEDIUM

## (undated) DEVICE — HOOD T5 PEELAWAY

## (undated) DEVICE — PREP CHLORAPREP HI-LITE ORANGE 26ML

## (undated) DEVICE — DRSG STERISTRIPS 0.5 X 4"

## (undated) DEVICE — SUT VICRYL 2-0 27" FS-1 UNDYED

## (undated) DEVICE — CANISTER DISPOSABLE THIN WALL 3000CC

## (undated) DEVICE — ELCTR GROUNDING PAD ADULT COVIDIEN

## (undated) DEVICE — DRSG AQUACEL 3.5 X 10"